# Patient Record
Sex: FEMALE | Race: WHITE | HISPANIC OR LATINO | Employment: FULL TIME | URBAN - METROPOLITAN AREA
[De-identification: names, ages, dates, MRNs, and addresses within clinical notes are randomized per-mention and may not be internally consistent; named-entity substitution may affect disease eponyms.]

---

## 2018-01-22 ENCOUNTER — HOSPITAL ENCOUNTER (EMERGENCY)
Facility: MEDICAL CENTER | Age: 23
End: 2018-01-22

## 2018-01-22 ENCOUNTER — HOSPITAL ENCOUNTER (EMERGENCY)
Facility: MEDICAL CENTER | Age: 23
End: 2018-01-22
Attending: EMERGENCY MEDICINE
Payer: COMMERCIAL

## 2018-01-22 VITALS
DIASTOLIC BLOOD PRESSURE: 82 MMHG | BODY MASS INDEX: 27.95 KG/M2 | WEIGHT: 173.94 LBS | TEMPERATURE: 97.3 F | RESPIRATION RATE: 16 BRPM | HEIGHT: 66 IN | HEART RATE: 77 BPM | OXYGEN SATURATION: 100 % | SYSTOLIC BLOOD PRESSURE: 114 MMHG

## 2018-01-22 LAB
ALBUMIN SERPL BCP-MCNC: 5 G/DL (ref 3.2–4.9)
ALBUMIN/GLOB SERPL: 1.5 G/DL
ALP SERPL-CCNC: 59 U/L (ref 30–99)
ALT SERPL-CCNC: 13 U/L (ref 2–50)
ANION GAP SERPL CALC-SCNC: 9 MMOL/L (ref 0–11.9)
AST SERPL-CCNC: 18 U/L (ref 12–45)
BILIRUB SERPL-MCNC: 0.5 MG/DL (ref 0.1–1.5)
BUN SERPL-MCNC: 8 MG/DL (ref 8–22)
CALCIUM SERPL-MCNC: 9.7 MG/DL (ref 8.5–10.5)
CHLORIDE SERPL-SCNC: 102 MMOL/L (ref 96–112)
CO2 SERPL-SCNC: 24 MMOL/L (ref 20–33)
CREAT SERPL-MCNC: 0.65 MG/DL (ref 0.5–1.4)
ERYTHROCYTE [DISTWIDTH] IN BLOOD BY AUTOMATED COUNT: 43.2 FL (ref 35.9–50)
GLOBULIN SER CALC-MCNC: 3.3 G/DL (ref 1.9–3.5)
GLUCOSE SERPL-MCNC: 84 MG/DL (ref 65–99)
HBV CORE AB SERPL QL IA: NEGATIVE
HBV SURFACE AB SERPL IA-ACNC: 4.37 MIU/ML (ref 0–10)
HBV SURFACE AG SER QL: NEGATIVE
HCT VFR BLD AUTO: 42.8 % (ref 37–47)
HCV AB SER QL: NEGATIVE
HGB BLD-MCNC: 14.3 G/DL (ref 12–16)
HIV 1+2 AB+HIV1 P24 AG SERPL QL IA: NON REACTIVE
MCH RBC QN AUTO: 30.5 PG (ref 27–33)
MCHC RBC AUTO-ENTMCNC: 33.4 G/DL (ref 33.6–35)
MCV RBC AUTO: 91.3 FL (ref 81.4–97.8)
PLATELET # BLD AUTO: 401 K/UL (ref 164–446)
PMV BLD AUTO: 10 FL (ref 9–12.9)
POTASSIUM SERPL-SCNC: 3.8 MMOL/L (ref 3.6–5.5)
PROT SERPL-MCNC: 8.3 G/DL (ref 6–8.2)
RBC # BLD AUTO: 4.69 M/UL (ref 4.2–5.4)
SODIUM SERPL-SCNC: 135 MMOL/L (ref 135–145)
WBC # BLD AUTO: 9.1 K/UL (ref 4.8–10.8)

## 2018-01-22 PROCEDURE — 80053 COMPREHEN METABOLIC PANEL: CPT

## 2018-01-22 PROCEDURE — 87389 HIV-1 AG W/HIV-1&-2 AB AG IA: CPT

## 2018-01-22 PROCEDURE — 86704 HEP B CORE ANTIBODY TOTAL: CPT

## 2018-01-22 PROCEDURE — 86803 HEPATITIS C AB TEST: CPT

## 2018-01-22 PROCEDURE — 85027 COMPLETE CBC AUTOMATED: CPT

## 2018-01-22 PROCEDURE — 90471 IMMUNIZATION ADMIN: CPT

## 2018-01-22 PROCEDURE — 90715 TDAP VACCINE 7 YRS/> IM: CPT | Performed by: EMERGENCY MEDICINE

## 2018-01-22 PROCEDURE — 87340 HEPATITIS B SURFACE AG IA: CPT

## 2018-01-22 PROCEDURE — 86706 HEP B SURFACE ANTIBODY: CPT

## 2018-01-22 PROCEDURE — 99284 EMERGENCY DEPT VISIT MOD MDM: CPT

## 2018-01-22 PROCEDURE — 700111 HCHG RX REV CODE 636 W/ 250 OVERRIDE (IP): Performed by: EMERGENCY MEDICINE

## 2018-01-22 RX ADMIN — CLOSTRIDIUM TETANI TOXOID ANTIGEN (FORMALDEHYDE INACTIVATED), CORYNEBACTERIUM DIPHTHERIAE TOXOID ANTIGEN (FORMALDEHYDE INACTIVATED), BORDETELLA PERTUSSIS TOXOID ANTIGEN (GLUTARALDEHYDE INACTIVATED), BORDETELLA PERTUSSIS FILAMENTOUS HEMAGGLUTININ ANTIGEN (FORMALDEHYDE INACTIVATED), BORDETELLA PERTUSSIS PERTACTIN ANTIGEN, AND BORDETELLA PERTUSSIS FIMBRIAE 2/3 ANTIGEN 0.5 ML: 5; 2; 2.5; 5; 3; 5 INJECTION, SUSPENSION INTRAMUSCULAR at 18:39

## 2018-01-22 ASSESSMENT — LIFESTYLE VARIABLES: DO YOU DRINK ALCOHOL: NO

## 2018-01-22 NOTE — LETTER
"  FORM C-4:  EMPLOYEE’S CLAIM FOR COMPENSATION/ REPORT OF INITIAL TREATMENT  EMPLOYEE’S CLAIM - PROVIDE ALL INFORMATION REQUESTED   First Name  Carolina Last Name  Roth Birthdate             Age  1995 22 y.o. Sex  female Claim Number   Home Employee Address  [address]                                     Zip  [address] Height  1.676 m (5' 6\") Weight  78.9 kg (173 lb 15.1 oz) [facility]     Mailing Employee Address                           [address]               Zip  [address] Telephone  [phone] (home)  Primary Language Spoken  ENGLISH   Insurer  [employer]   [employer] Employee's Occupation (Job Title) When Injury or Occupational Disease Occurred     Employer's Name   Telephone  [phone]    Employer Address  [address] [place] [place] [address]] Zip  [address]   Date of Injury  1/22/2018       Hour of Injury  5:00 PM Date Employer Notified  1/22/2018 Last Day of Work after Injury or Occupational Disease  1/22/2018 Supervisor to Whom Injury Reported  Razia   Address or Location of Accident (if applicable)  [[address]]   What were you doing at the time of accident? (if applicable)  Working    How did this injury or occupational disease occur? Be specific and answer in detail. Use additional sheet if necessary)  Changing the sharps cont.   If you believe that you have an occupational disease, when did you first have knowledge of the disability and it relationship to your employment?  N/A Witnesses to the Accident  Razia     Nature of Injury or Occupational Disease  Workers' Compensation  Part(s) of Body Injured or Affected  Thumb (L), N/A, N/A    I certify that the above is true and correct to the best of my knowledge and that I have provided this information in order to obtain the benefits of [place] Insurance and " [place]).  I hereby authorize any physician, chiropractor, surgeon, practitioner, or other person, any hospital, including [facility] or [facility] hospital, any medical service organization, any insurance company, or other institution or organization to release to each other, any medical or other information, including benefits paid or payable, pertinent to this injury or disease, except information relative to diagnosis, treatment and/or counseling for AIDS, psychological conditions, alcohol or controlled substances, for which I must give specific authorization.  A Photostat of this authorization shall be as valid as the original.   Date  01/22/2018 [facility] Employee’s Signature   THIS REPORT MUST BE COMPLETED AND MAILED WITHIN 3 WORKING DAYS OF TREATMENT   Place  [facility], EMERGENCY DEPT  Name of Facility   [facility]   Date  1/22/2018 Diagnosis  (W27.3XXA) Needlestick injury accident, initial encounter Is there evidence the injured employee was under the influence of alcohol and/or another controlled substance at the time of accident?   Hour  6:55 PM Description of Injury or Disease  Needlestick injury accident, initial encounter No   Treatment  Physician evaluation and treatment of body fluid exposure  Have you advised the patient to remain off work five days or more?         No   X-Ray Findings  Negative   If Yes   From Date    To Date      From information given by the employee, together with medical evidence, can you directly connect this injury or occupational disease as job incurred?  Yes If No, is the employee capable of: Full Duty  Yes Modified Duty  Yes   Is additional medical care by a physician indicated?  Yes  Comments:follow-up at occupational health for testing results If Modified Duty, Specify any Limitations / Restrictions        Do you know of  "any previous injury or disease contributing to this condition or occupational disease?  No   Date  1/22/2018 Print Doctor’s Name  Colby Phelps I certify the employer’s copy of this form was mailed on:   Address  [address]  [place] [address] [phone] Insurer’s Use Only   [facility]  [id number]    Provider’s Tax ID Number    Telephone  Dept: [phone]    Doctor’s Signature  e-COLBY Griffin M.D. Degree   MD    Original - TREATING PHYSICIAN OR CHIROPRACTOR   Pg 2-Insurer/TPA   Pg 3-Employer   Pg 4-Employee                                                                                                  Form C-4 (rev01/03)       BRIEF DESCRIPTION OF RIGHTS AND BENEFITS  (Pursuant to [place]RS 616C.050)    Notice of Injury or Occupational Disease (Incident Report Form C-1): If an injury or occupational disease (OD) arises out of and in the course of employment, you must provide written notice to your employer as soon as practicable, but no later than 7 days after the accident or OD. Your employer shall maintain a sufficient supply of the required forms.    Claim for Compensation (Form C-4): If medical treatment is sought, the form C-4 is available at the place of initial treatment. A completed \"Claim for Compensation\" (Form C-4) must be filed within 90 days after an accident or OD. The treating physician or chiropractor must, within 3 working days after treatment, complete and mail to the employer, the employer's insurer and third-party , the Claim for Compensation.    Medical Treatment: If you require medical treatment for your on-the-job injury or OD, you may be required to select a physician or chiropractor from a list provided by your workers’ compensation insurer, if it has contracted with an Organization for Managed Care (MCO) or Preferred Provider Organization (PPO) or providers of health care. If your employer has not entered into a contract with an MCO or PPO, you " may select a physician or chiropractor from the Panel of Physicians and Chiropractors. Any medical costs related to your industrial injury or OD will be paid by your insurer.    Temporary Total Disability (TTD): If your doctor has certified that you are unable to work for a period of at least 5 consecutive days, or 5 cumulative days in a 20-day period, or places restrictions on you that your employer does not accommodate, you may be entitled to TTD compensation.    Temporary Partial Disability (TPD): If the wage you receive upon reemployment is less than the compensation for TTD to which you are entitled, the insurer may be required to pay you TPD compensation to make up the difference. TPD can only be paid for a maximum of 24 months.    Permanent Partial Disability (PPD): When your medical condition is stable and there is an indication of a PPD as a result of your injury or OD, within 30 days, your insurer must arrange for an evaluation by a rating physician or chiropractor to determine the degree of your PPD. The amount of your PPD award depends on the date of injury, the results of the PPD evaluation and your age and wage.    Permanent Total Disability (PTD): If you are medically certified by a treating physician or chiropractor as permanently and totally disabled and have been granted a PTD status by your insurer, you are entitled to receive monthly benefits not to exceed 66 2/3% of your average monthly wage. The amount of your PTD payments is subject to reduction if you previously received a PPD award.    Vocational Rehabilitation Services: You may be eligible for vocational rehabilitation services if you are unable to return to the job due to a permanent physical impairment or permanent restrictions as a result of your injury or occupational disease.    Transportation and Per Pushpa Reimbursement: You may be eligible for travel expenses and per pushpa associated with medical treatment.  Reopening: You may be able  to reopen your claim if your condition worsens after claim closure.    Appeal Process: If you disagree with a written determination issued by the insurer or the insurer does not respond to your request, you may appeal to the Department of Administration, , by following the instructions contained in your determination letter. You must appeal the determination within 70 days from the date of the determination letter at [address], [address], [place], [place] [address], or [address], [place], [place] [address]. If you disagree with the  decision, you may appeal to the Department of Administration, . You must file your appeal within 30 days from the date of the  decision letter at [address], [address], [place], [place] [address], or [address], [place], [place] [address]. If you disagree with a decision of an , you may file a petition for judicial review with the District Court. You must do so within 30 days of the Appeal Officer’s decision. You may be represented by an  at your own expense or you may contact the [facility] for possible representation.    [place]  for Injured Workers (NAIW): If you disagree with a  decision, you may request that NAIW represent you without charge at an  Hearing. For information regarding denial of benefits, you may contact the [facility] at: [address], [address], [place] [address], [phone], or [address], [place] [address], [phone]    To File a Complaint with the Division: If you wish to file a complaint with the  of the Division of Industrial Relations (DIR), please contact the Workers’ Compensation Section, [address], [address], [place], [place] [address], telephone [phone], or [address], [place]  [address], telephone [phone].    For assistance with Workers’ Compensation Issues: you may contact the Office of the Governor Consumer Health Assistance, [address] [place] [address], [address], [place], [place] [address], Toll Free 1-793.165.7921, Web site: http://[employer].[facility].[place]., E-mail [email or web address].                                                                                                                                                                               __________________________________________________________________                                    ____01/22/2018___            Employee Name / Signature                                                                                                                            Date                                       D-2 (rev. 10/07)

## 2018-01-23 NOTE — DISCHARGE INSTRUCTIONS
Needle Stick Injury  A needle stick injury occurs when you are stuck by a needle that may have the blood from another person on it. Most of the time these injuries heal without any problem, but several diseases can be transmitted this way. You should be aware of the risks. A needle stick injury can cause risk for getting:  · Hepatitis B.  · Hepatitis C.  · HIV infection (the virus that causes AIDS).  The chance of getting one of these infections from a needle stick injury is small. However, it is important to take proper precautions to prevent such an injury. It is also important to understand and follow some health care recommendations when such an injury occurs.   RISK FACTORS  In general, the risk of infection after a needle stick injury appears to be higher with:  · Exposure to a needle that is visibly contaminated with blood.  · Exposure to the blood of a patient with an advanced disease or with a high viral load.  · A deep injury.  · Needle placement in a vein or artery.  PREVENTION   All health care workers should:  · Wash their hands often, including before and after caring for each patient.  · Receive the hepatitis B vaccine before any possible exposure to blood or bloody body fluids.  · Use personal protective equipment (PPE) when appropriate. This includes:  ¨ Gloves.  ¨ Gowns.  ¨ Boots.  ¨ Shoe covers.  ¨ Eyewear.  ¨ Masks.  · Wear gloves when any kind of venous or arterial access is being done.  · Use safety devices when available.  · Use sharp edges and needles with caution.  · Dispose of used needles and other sharps in puncture proof receptacles.  · Never recap needles.  TREATMENT   · After a needle stick injury, immediate cleansing with soap and water or an alcohol-based hand hygiene agent is needed.  · If you did not have a tetanus booster within the past 10 years, a booster shot should be given.  · If the puncture site becomes red, swollen, more painful, or drains yellowish-white fluid (pus),  medicine for a bacterial infection may be needed.  · If the blood on the needle is known or thought to be high risk for hepatitis B or HIV, additional treatment is needed.  · For needle stick exposures to the HIV virus, drug treatment is advised. This treatment is called post-exposure prophylaxis (PEP) and should be started as soon as possible following the injury. The recommended period of treatment with medicines is usually 4 weeks with 2 or more different drugs. You should have follow-up counseling and a medical evaluation, including HIV blood tests, right away. The tests should be repeated at 6 weeks, 3 months, and 6 months. Blood tests to monitor for drug toxicity effects of the PEP medicines are usually recommended immediately before treatment starts and again at 2 weeks and 4 weeks after the start of PEP. Additional recommendations during the first 6 to 12 weeks after exposure include:  ¨ Practicing sexual abstinence or using condoms to prevent sexual transmission and to avoid pregnancy.  ¨ Refraining from donating blood, plasma, semen, organs, or other tissue.  ¨ For breastfeeding women, considering temporary discontinuation of breastfeeding while on PEP.  · For needle stick exposures to hepatitis B, blood testing and PEP is also needed. If you have not been vaccinated against hepatitis B, this vaccine series should be started and hepatitis B immune globulin should also be given. If you have been previously vaccinated, your status of immunity to infection with hepatitis B can be tested by a blood antibody test. Before or after those test results are available, repeat vaccination with or without hepatitis B immune globulin will be considered.  · Unfortunately, no helpful treatment following hepatitis C exposure has been identified or is recommended. Follow-up blood testing is advised over a period of 4 weeks to 6 months to determine if the needle stick led to an infection. Ask your caregiver for advice about  this follow-up testing.  HOME CARE INSTRUCTIONS  · Take medicine exactly as told by your caregiver.  · Keep all follow-up appointments.  · Do not share personal hygiene items.  SEEK IMMEDIATE MEDICAL CARE IF:  · You have concerns about your injury, treatment, or follow-up.  · The injury site becomes red, swollen, or painful.  · The injury site drains pus.  MAKE SURE YOU:  · Understand these instructions.  · Will watch your condition.  · Will get help right away if you are not doing well or get worse.     This information is not intended to replace advice given to you by your health care provider. Make sure you discuss any questions you have with your health care provider.     Document Released: 12/18/2006 Document Revised: 01/08/2016 Document Reviewed: 05/22/2012  [employer] Interactive Patient Education ©2016 [employer]

## 2018-01-23 NOTE — ED NOTES
Pt ambulates to triage, transfer from [facility], for needle stick.  Pt states she poked herself with a dirty needle at 1600 today while at work.  A&ox4.  Pt to lobby & advised to inform RN of any changes.

## 2018-01-23 NOTE — ED NOTES
All lines and monitors DC'd.  Discharge instructions given, questions answered.  Ambulatory out of ER, escorted by RN.  Pt reports all belongings in possession.  Instructed not to drive after taking pain meds and pt verbalizes understanding.  Rx x zero given.   Patient instructed to follow up with [facility]

## 2018-01-23 NOTE — ED PROVIDER NOTES
"ED Provider Note    CHIEF COMPLAINT  Chief Complaint   Patient presents with   • Body Fluid Exposure     poked self with dirty needle at 1600 today       HPI  Nuvia Roth is a 22 y.o. female who presents for evaluation of occupational fluid exposure. 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 While she was disposing of the needle she accidentally caused a superficial needle stick in the dorsal aspect of the left hand without bleeding. She was sent over for evaluation. The patient is otherwise healthy. Needlestick occurred about 2 hours ago she medially washed her hands there is no bleeding. The source patient is apparently homosexual but reportedly hepatitis and HIV negative. He apparently is getting referred for laboratory testing tomorrow.     REVIEW OF SYSTEMS  See HPI for further details. No numbness tingling weakness fevers or chills All other systems are negative.     PAST MEDICAL HISTORY  Past Medical History:   Diagnosis Date   • H. pylori infection        FAMILY HISTORY  Noncontributory    SOCIAL HISTORY  Social History     Social History   • Marital status: Single     Spouse name: N/A   • Number of children: N/A   • Years of education: N/A     Social History Main Topics   • Smoking status: Not on file   • Smokeless tobacco: Not on file   • Alcohol use Not on file   • Drug use: Unknown   • Sexual activity: Not on file     Other Topics Concern   • Not on file     Social History Narrative   • No narrative on file   No drugs or alcohol    SURGICAL HISTORY  No past surgical history on file.  No major surgeries  CURRENT MEDICATIONS  Home Medications    **Home medications have not yet been reviewed for this encounter**         ALLERGIES  No Known Allergies    PHYSICAL EXAM  VITAL SIGNS: /72   Pulse 76   Temp 36.3 °C (97.3 °F)   Resp 14   Ht 1.676 m (5' 6\")   Wt 78.9 kg (173 lb 15.1 oz)   SpO2 100%   BMI 28.08 kg/m²  Room " air O2: 100    Constitutional: Well developed, Well nourished, No acute distress, Non-toxic appearance.   HENT: Normocephalic, Atraumatic, Bilateral external ears normal, Oropharynx moist, No oral exudates, Nose normal.   Eyes: PERRLA, EOMI, Conjunctiva normal, No discharge.   Neck: Normal range of motion, No tenderness, Supple, No stridor.   Cardiovascular: Normal heart rate, Normal rhythm, No murmurs, No rubs, No gallops.   Thorax & Lungs: Normal breath sounds, No respiratory distress, No wheezing, No chest tenderness.   Abdomen: Bowel sounds normal, Soft, No tenderness, No masses, No pulsatile masses.   Extremities: Superficial barely visible needlestick on the dorsal aspect of the left hand no bleeding no foreign body  Neurologic: Alert & oriented x 3, Normal motor function, Normal sensory function, No focal deficits noted.   Psychiatric: Affect normal, Judgment normal, Mood normal.     COURSE & MEDICAL DECISION MAKING  Pertinent Labs & Imaging studies reviewed. (See chart for details)  I counseled the patient on the risks and benefits of PEP treatment. This is an exceedingly low risk injury and the fact that it was an intramuscular shot. The source patient is apparently homosexual which is a risk factor but she apparently gets tested and is known to be HIV negative and he is tested tomorrow. I counseled the patient that this is an exceedingly low risk exposure but I did still offer 1st dose of PEP. She has declined which I think is reasonable given the fact that this is a low risk injury. She was given a tetanus vaccine and will need to follow up at occupational health for ongoing management    FINAL IMPRESSION  1. Needlestick left hand with body fluid exposure         Electronically signed by: Colby Phelps, 1/22/2018 6:12 PM

## 2021-03-27 ENCOUNTER — HOSPITAL ENCOUNTER (OUTPATIENT)
Dept: LAB | Facility: MEDICAL CENTER | Age: 26
End: 2021-03-27
Attending: OBSTETRICS & GYNECOLOGY
Payer: COMMERCIAL

## 2021-03-27 LAB
B-HCG SERPL-ACNC: <1 MIU/ML (ref 0–5)
PROGEST SERPL-MCNC: 17.5 NG/ML
PROLACTIN SERPL-MCNC: 21.6 NG/ML (ref 2.8–26)

## 2021-03-27 PROCEDURE — 84144 ASSAY OF PROGESTERONE: CPT

## 2021-03-27 PROCEDURE — 36415 COLL VENOUS BLD VENIPUNCTURE: CPT

## 2021-03-27 PROCEDURE — 84702 CHORIONIC GONADOTROPIN TEST: CPT

## 2021-03-27 PROCEDURE — 84146 ASSAY OF PROLACTIN: CPT

## 2021-12-17 ENCOUNTER — HOSPITAL ENCOUNTER (OUTPATIENT)
Dept: LAB | Facility: MEDICAL CENTER | Age: 26
End: 2021-12-17
Attending: OBSTETRICS & GYNECOLOGY
Payer: COMMERCIAL

## 2021-12-17 LAB
ABO GROUP BLD: NORMAL
BLD GP AB SCN SERPL QL: NORMAL
HCV AB SER QL: NORMAL
HIV 1+2 AB+HIV1 P24 AG SERPL QL IA: NORMAL
RH BLD: NORMAL

## 2021-12-17 PROCEDURE — 86762 RUBELLA ANTIBODY: CPT

## 2021-12-17 PROCEDURE — 86803 HEPATITIS C AB TEST: CPT

## 2021-12-17 PROCEDURE — 86850 RBC ANTIBODY SCREEN: CPT

## 2021-12-17 PROCEDURE — 87340 HEPATITIS B SURFACE AG IA: CPT

## 2021-12-17 PROCEDURE — 81420 FETAL CHRMOML ANEUPLOIDY: CPT

## 2021-12-17 PROCEDURE — 81220 CFTR GENE COM VARIANTS: CPT

## 2021-12-17 PROCEDURE — 36415 COLL VENOUS BLD VENIPUNCTURE: CPT

## 2021-12-17 PROCEDURE — 86901 BLOOD TYPING SEROLOGIC RH(D): CPT

## 2021-12-17 PROCEDURE — 85025 COMPLETE CBC W/AUTO DIFF WBC: CPT

## 2021-12-17 PROCEDURE — 86780 TREPONEMA PALLIDUM: CPT

## 2021-12-17 PROCEDURE — 87389 HIV-1 AG W/HIV-1&-2 AB AG IA: CPT

## 2021-12-17 PROCEDURE — 86900 BLOOD TYPING SEROLOGIC ABO: CPT

## 2021-12-17 PROCEDURE — 81003 URINALYSIS AUTO W/O SCOPE: CPT

## 2021-12-24 ENCOUNTER — HOSPITAL ENCOUNTER (OUTPATIENT)
Facility: MEDICAL CENTER | Age: 26
End: 2021-12-24
Attending: OBSTETRICS & GYNECOLOGY
Payer: COMMERCIAL

## 2021-12-24 LAB
APPEARANCE UR: ABNORMAL
BACTERIA #/AREA URNS HPF: ABNORMAL /HPF
BILIRUB UR QL STRIP.AUTO: NEGATIVE
COLOR UR: YELLOW
EPI CELLS #/AREA URNS HPF: ABNORMAL /HPF
GLUCOSE UR STRIP.AUTO-MCNC: NEGATIVE MG/DL
HYALINE CASTS #/AREA URNS LPF: ABNORMAL /LPF
KETONES UR STRIP.AUTO-MCNC: NEGATIVE MG/DL
LEUKOCYTE ESTERASE UR QL STRIP.AUTO: ABNORMAL
MICRO URNS: ABNORMAL
NITRITE UR QL STRIP.AUTO: NEGATIVE
PH UR STRIP.AUTO: 6.5 [PH] (ref 5–8)
PROT UR QL STRIP: NEGATIVE MG/DL
RBC # URNS HPF: ABNORMAL /HPF
RBC UR QL AUTO: ABNORMAL
SP GR UR STRIP.AUTO: 1.01
UROBILINOGEN UR STRIP.AUTO-MCNC: 0.2 MG/DL
WBC #/AREA URNS HPF: ABNORMAL /HPF

## 2021-12-24 PROCEDURE — 81001 URINALYSIS AUTO W/SCOPE: CPT

## 2021-12-24 PROCEDURE — 87086 URINE CULTURE/COLONY COUNT: CPT

## 2021-12-26 LAB
BACTERIA UR CULT: NORMAL
BASOPHILS # BLD AUTO: 0.4 % (ref 0–1.8)
BASOPHILS # BLD: 0.05 K/UL (ref 0–0.12)
COLOR UR: ABNORMAL
EOSINOPHIL # BLD AUTO: 0.04 K/UL (ref 0–0.51)
EOSINOPHIL NFR BLD: 0.3 % (ref 0–6.9)
ERYTHROCYTE [DISTWIDTH] IN BLOOD BY AUTOMATED COUNT: 45.6 FL (ref 35.9–50)
HBV SURFACE AG SER QL: ABNORMAL
HCT VFR BLD AUTO: 38.6 % (ref 37–47)
HGB BLD-MCNC: 12.8 G/DL (ref 12–16)
IMM GRANULOCYTES # BLD AUTO: 0.06 K/UL (ref 0–0.11)
IMM GRANULOCYTES NFR BLD AUTO: 0.4 % (ref 0–0.9)
LYMPHOCYTES # BLD AUTO: 2.97 K/UL (ref 1–4.8)
LYMPHOCYTES NFR BLD: 20.9 % (ref 22–41)
MCH RBC QN AUTO: 31.1 PG (ref 27–33)
MCHC RBC AUTO-ENTMCNC: 33.2 G/DL (ref 33.6–35)
MCV RBC AUTO: 93.7 FL (ref 81.4–97.8)
MONOCYTES # BLD AUTO: 0.75 K/UL (ref 0–0.85)
MONOCYTES NFR BLD AUTO: 5.3 % (ref 0–13.4)
NEUTROPHILS # BLD AUTO: 10.34 K/UL (ref 2–7.15)
NEUTROPHILS NFR BLD: 72.7 % (ref 44–72)
NRBC # BLD AUTO: 0 K/UL
NRBC BLD-RTO: 0 /100 WBC
PLATELET # BLD AUTO: 353 K/UL (ref 164–446)
PMV BLD AUTO: 13.5 FL (ref 9–12.9)
RBC # BLD AUTO: 4.12 M/UL (ref 4.2–5.4)
RUBV AB SER QL: 234 IU/ML
SIGNIFICANT IND 70042: NORMAL
SITE SITE: NORMAL
SOURCE SOURCE: NORMAL
TREPONEMA PALLIDUM IGG+IGM AB [PRESENCE] IN SERUM OR PLASMA BY IMMUNOASSAY: ABNORMAL
WBC # BLD AUTO: 14.2 K/UL (ref 4.8–10.8)

## 2021-12-27 LAB
ANNOTATION COMMENT IMP: NORMAL
CF EXPANDED VARIANT PANEL INTERP Q4864: NORMAL
CFTR ALLELE 1 BLD/T QL: NEGATIVE
CFTR ALLELE 1 BLD/T QL: NEGATIVE
CFTR MUT ANL BLD/T: NORMAL
FET CHR X + Y ANEUP RISK PLAS.CFDNA QN: NORMAL
FET SEX US: NORMAL
FET TS 13 RISK PLAS.CFDNA QL: NORMAL
FET TS 18 RISK PLAS.CFDNA QL: NORMAL
FET TS 21 RISK PLAS.CFDNA QL: NORMAL
FETAL FRACTION Q0204: 5.8 %
GA (DAYS): 3 D
GA (WEEKS): 10 WEEKS
PATHOLOGY STUDY: NORMAL
SERVICE CMNT-IMP: YES
TRIPLOIDY VAN TWIN Q0202: NORMAL

## 2022-02-08 ENCOUNTER — HOSPITAL ENCOUNTER (OUTPATIENT)
Dept: RADIOLOGY | Facility: MEDICAL CENTER | Age: 27
End: 2022-02-08
Attending: OBSTETRICS & GYNECOLOGY
Payer: COMMERCIAL

## 2022-02-17 ENCOUNTER — HOSPITAL ENCOUNTER (OUTPATIENT)
Dept: RADIOLOGY | Facility: MEDICAL CENTER | Age: 27
End: 2022-02-17
Attending: OBSTETRICS & GYNECOLOGY
Payer: COMMERCIAL

## 2022-02-17 DIAGNOSIS — N63.0 BREAST SWELLING: ICD-10-CM

## 2022-02-17 DIAGNOSIS — L53.9 ERYTHEMA: ICD-10-CM

## 2022-02-17 PROCEDURE — 76642 ULTRASOUND BREAST LIMITED: CPT | Mod: RT

## 2022-06-15 LAB — GP B STREP DNA SPEC QL NAA+PROBE: NORMAL

## 2022-06-27 ENCOUNTER — ANESTHESIA (OUTPATIENT)
Dept: ANESTHESIOLOGY | Facility: MEDICAL CENTER | Age: 27
End: 2022-06-27
Payer: COMMERCIAL

## 2022-06-27 ENCOUNTER — HOSPITAL ENCOUNTER (INPATIENT)
Facility: MEDICAL CENTER | Age: 27
LOS: 2 days | End: 2022-06-29
Attending: OBSTETRICS & GYNECOLOGY | Admitting: OBSTETRICS & GYNECOLOGY
Payer: COMMERCIAL

## 2022-06-27 ENCOUNTER — APPOINTMENT (OUTPATIENT)
Dept: OBGYN | Facility: MEDICAL CENTER | Age: 27
End: 2022-06-27
Attending: OBSTETRICS & GYNECOLOGY
Payer: COMMERCIAL

## 2022-06-27 ENCOUNTER — ANESTHESIA EVENT (OUTPATIENT)
Dept: ANESTHESIOLOGY | Facility: MEDICAL CENTER | Age: 27
End: 2022-06-27
Payer: COMMERCIAL

## 2022-06-27 ENCOUNTER — APPOINTMENT (OUTPATIENT)
Dept: RADIOLOGY | Facility: MEDICAL CENTER | Age: 27
End: 2022-06-27
Attending: OBSTETRICS & GYNECOLOGY
Payer: COMMERCIAL

## 2022-06-27 LAB
BASOPHILS # BLD AUTO: 0.4 % (ref 0–1.8)
BASOPHILS # BLD: 0.06 K/UL (ref 0–0.12)
EOSINOPHIL # BLD AUTO: 0.04 K/UL (ref 0–0.51)
EOSINOPHIL NFR BLD: 0.3 % (ref 0–6.9)
ERYTHROCYTE [DISTWIDTH] IN BLOOD BY AUTOMATED COUNT: 45.1 FL (ref 35.9–50)
HCT VFR BLD AUTO: 33.5 % (ref 37–47)
HGB BLD-MCNC: 10.6 G/DL (ref 12–16)
HOLDING TUBE BB 8507: NORMAL
IMM GRANULOCYTES # BLD AUTO: 0.21 K/UL (ref 0–0.11)
IMM GRANULOCYTES NFR BLD AUTO: 1.4 % (ref 0–0.9)
LYMPHOCYTES # BLD AUTO: 2.23 K/UL (ref 1–4.8)
LYMPHOCYTES NFR BLD: 15.3 % (ref 22–41)
MCH RBC QN AUTO: 28.5 PG (ref 27–33)
MCHC RBC AUTO-ENTMCNC: 31.6 G/DL (ref 33.6–35)
MCV RBC AUTO: 90.1 FL (ref 81.4–97.8)
MONOCYTES # BLD AUTO: 0.97 K/UL (ref 0–0.85)
MONOCYTES NFR BLD AUTO: 6.7 % (ref 0–13.4)
NEUTROPHILS # BLD AUTO: 11.05 K/UL (ref 2–7.15)
NEUTROPHILS NFR BLD: 75.9 % (ref 44–72)
NRBC # BLD AUTO: 0 K/UL
NRBC BLD-RTO: 0 /100 WBC
PLATELET # BLD AUTO: 263 K/UL (ref 164–446)
PMV BLD AUTO: 14.3 FL (ref 9–12.9)
RBC # BLD AUTO: 3.72 M/UL (ref 4.2–5.4)
WBC # BLD AUTO: 14.6 K/UL (ref 4.8–10.8)

## 2022-06-27 PROCEDURE — 36415 COLL VENOUS BLD VENIPUNCTURE: CPT

## 2022-06-27 PROCEDURE — 770002 HCHG ROOM/CARE - OB PRIVATE (112)

## 2022-06-27 PROCEDURE — 700102 HCHG RX REV CODE 250 W/ 637 OVERRIDE(OP): Performed by: OBSTETRICS & GYNECOLOGY

## 2022-06-27 PROCEDURE — 01960 ANES VAGINAL DELIVERY ONLY: CPT | Performed by: ANESTHESIOLOGY

## 2022-06-27 PROCEDURE — 85025 COMPLETE CBC W/AUTO DIFF WBC: CPT

## 2022-06-27 PROCEDURE — 76815 OB US LIMITED FETUS(S): CPT

## 2022-06-27 PROCEDURE — 700105 HCHG RX REV CODE 258: Performed by: OBSTETRICS & GYNECOLOGY

## 2022-06-27 PROCEDURE — 303615 HCHG EPIDURAL/SPINAL ANESTHESIA FOR LABOR

## 2022-06-27 PROCEDURE — 700111 HCHG RX REV CODE 636 W/ 250 OVERRIDE (IP)

## 2022-06-27 PROCEDURE — 700111 HCHG RX REV CODE 636 W/ 250 OVERRIDE (IP): Performed by: ANESTHESIOLOGY

## 2022-06-27 PROCEDURE — 700111 HCHG RX REV CODE 636 W/ 250 OVERRIDE (IP): Performed by: OBSTETRICS & GYNECOLOGY

## 2022-06-27 PROCEDURE — A9270 NON-COVERED ITEM OR SERVICE: HCPCS | Performed by: OBSTETRICS & GYNECOLOGY

## 2022-06-27 RX ORDER — SODIUM CHLORIDE, SODIUM LACTATE, POTASSIUM CHLORIDE, CALCIUM CHLORIDE 600; 310; 30; 20 MG/100ML; MG/100ML; MG/100ML; MG/100ML
INJECTION, SOLUTION INTRAVENOUS CONTINUOUS
Status: DISCONTINUED | OUTPATIENT
Start: 2022-06-27 | End: 2022-06-27

## 2022-06-27 RX ORDER — SODIUM CHLORIDE, SODIUM LACTATE, POTASSIUM CHLORIDE, CALCIUM CHLORIDE 600; 310; 30; 20 MG/100ML; MG/100ML; MG/100ML; MG/100ML
INJECTION, SOLUTION INTRAVENOUS CONTINUOUS
Status: DISCONTINUED | OUTPATIENT
Start: 2022-06-27 | End: 2022-06-29 | Stop reason: HOSPADM

## 2022-06-27 RX ORDER — IBUPROFEN 800 MG/1
800 TABLET ORAL
Status: DISCONTINUED | OUTPATIENT
Start: 2022-06-27 | End: 2022-06-28 | Stop reason: HOSPADM

## 2022-06-27 RX ORDER — OXYTOCIN 10 [USP'U]/ML
10 INJECTION, SOLUTION INTRAMUSCULAR; INTRAVENOUS
Status: DISCONTINUED | OUTPATIENT
Start: 2022-06-27 | End: 2022-06-28 | Stop reason: HOSPADM

## 2022-06-27 RX ORDER — BUPIVACAINE HYDROCHLORIDE 2.5 MG/ML
INJECTION, SOLUTION EPIDURAL; INFILTRATION; INTRACAUDAL
Status: COMPLETED
Start: 2022-06-27 | End: 2022-06-27

## 2022-06-27 RX ORDER — ACETAMINOPHEN 500 MG
1000 TABLET ORAL
Status: DISCONTINUED | OUTPATIENT
Start: 2022-06-27 | End: 2022-06-28 | Stop reason: HOSPADM

## 2022-06-27 RX ORDER — TERBUTALINE SULFATE 1 MG/ML
0.25 INJECTION, SOLUTION SUBCUTANEOUS
Status: DISCONTINUED | OUTPATIENT
Start: 2022-06-27 | End: 2022-06-28 | Stop reason: HOSPADM

## 2022-06-27 RX ORDER — ROPIVACAINE HYDROCHLORIDE 2 MG/ML
INJECTION, SOLUTION EPIDURAL; INFILTRATION; PERINEURAL
Status: COMPLETED
Start: 2022-06-27 | End: 2022-06-27

## 2022-06-27 RX ORDER — BUPIVACAINE HYDROCHLORIDE 2.5 MG/ML
INJECTION, SOLUTION EPIDURAL; INFILTRATION; INTRACAUDAL PRN
Status: DISCONTINUED | OUTPATIENT
Start: 2022-06-27 | End: 2022-06-28 | Stop reason: SURG

## 2022-06-27 RX ADMIN — MISOPROSTOL 25 MCG: 100 TABLET ORAL at 08:48

## 2022-06-27 RX ADMIN — BUPIVACAINE HYDROCHLORIDE 2.5 ML: 2.5 INJECTION, SOLUTION EPIDURAL; INFILTRATION; INTRACAUDAL; PERINEURAL at 20:43

## 2022-06-27 RX ADMIN — OXYTOCIN 2 MILLI-UNITS/MIN: 10 INJECTION, SOLUTION INTRAMUSCULAR; INTRAVENOUS at 13:12

## 2022-06-27 RX ADMIN — BUPIVACAINE HYDROCHLORIDE 2.5 ML: 2.5 INJECTION, SOLUTION EPIDURAL; INFILTRATION; INTRACAUDAL; PERINEURAL at 20:48

## 2022-06-27 RX ADMIN — ROPIVACAINE HYDROCHLORIDE 200 MG: 2 INJECTION, SOLUTION EPIDURAL; INFILTRATION at 20:51

## 2022-06-27 RX ADMIN — FENTANYL CITRATE 50 MCG: 50 INJECTION, SOLUTION INTRAMUSCULAR; INTRAVENOUS at 20:43

## 2022-06-27 RX ADMIN — SODIUM CHLORIDE, POTASSIUM CHLORIDE, SODIUM LACTATE AND CALCIUM CHLORIDE: 600; 310; 30; 20 INJECTION, SOLUTION INTRAVENOUS at 08:16

## 2022-06-27 RX ADMIN — FENTANYL CITRATE 50 MCG: 50 INJECTION, SOLUTION INTRAMUSCULAR; INTRAVENOUS at 20:48

## 2022-06-27 RX ADMIN — ROPIVACAINE HYDROCHLORIDE 200 MG: 2 INJECTION, SOLUTION EPIDURAL; INFILTRATION; PERINEURAL at 20:51

## 2022-06-27 ASSESSMENT — COPD QUESTIONNAIRES
HAVE YOU SMOKED AT LEAST 100 CIGARETTES IN YOUR ENTIRE LIFE: NO/DON'T KNOW
IN THE PAST 12 MONTHS DO YOU DO LESS THAN YOU USED TO BECAUSE OF YOUR BREATHING PROBLEMS: DISAGREE/UNSURE
DURING THE PAST 4 WEEKS HOW MUCH DID YOU FEEL SHORT OF BREATH: NONE/LITTLE OF THE TIME
COPD SCREENING SCORE: 0
DO YOU EVER COUGH UP ANY MUCUS OR PHLEGM?: NO/ONLY WITH OCCASIONAL COLDS OR INFECTIONS

## 2022-06-27 ASSESSMENT — LIFESTYLE VARIABLES
HOW MANY TIMES IN THE PAST YEAR HAVE YOU HAD 5 OR MORE DRINKS IN A DAY: 0
CONSUMPTION TOTAL: NEGATIVE
TOTAL SCORE: 0
HAVE PEOPLE ANNOYED YOU BY CRITICIZING YOUR DRINKING: NO
ON A TYPICAL DAY WHEN YOU DRINK ALCOHOL HOW MANY DRINKS DO YOU HAVE: 0
HAVE YOU EVER FELT YOU SHOULD CUT DOWN ON YOUR DRINKING: NO
TOTAL SCORE: 0
EVER_SMOKED: NEVER
EVER HAD A DRINK FIRST THING IN THE MORNING TO STEADY YOUR NERVES TO GET RID OF A HANGOVER: NO
AVERAGE NUMBER OF DAYS PER WEEK YOU HAVE A DRINK CONTAINING ALCOHOL: 0
EVER FELT BAD OR GUILTY ABOUT YOUR DRINKING: NO
DOES PATIENT WANT TO STOP DRINKING: NO
TOTAL SCORE: 0

## 2022-06-27 ASSESSMENT — PATIENT HEALTH QUESTIONNAIRE - PHQ9
1. LITTLE INTEREST OR PLEASURE IN DOING THINGS: NOT AT ALL
2. FEELING DOWN, DEPRESSED, IRRITABLE, OR HOPELESS: NOT AT ALL
SUM OF ALL RESPONSES TO PHQ9 QUESTIONS 1 AND 2: 0

## 2022-06-27 NOTE — CARE PLAN
Problem: Knowledge Deficit - L&D  Goal: Patient and family/caregivers will demonstrate understanding of plan of care, disease process/condition, diagnostic tests and medications  Outcome: Progressing  Note: POC was discussed with pt.   The patient is Stable - Low risk of patient condition declining or worsening         Progress made toward(s) clinical / shift goals:  pt is educated in pain    Patient is not progressing towards the following goals:

## 2022-06-27 NOTE — PROGRESS NOTES
Pt is here for IOL due to low TYRA and aging of placenta.   37.6 weeks gestation, upon SVE could not verify position. Dr Mcnamara was called. US was ordered for position.  Pt was admitted, IV started. .  0850 US verified the vertex position.  0853 cytotec was placed.  190 Dr Mcnamara in SVE 3/70/1, AROM clear, IUPC was inserted. Report given to Shea LOZANO

## 2022-06-28 PROCEDURE — 770002 HCHG ROOM/CARE - OB PRIVATE (112)

## 2022-06-28 PROCEDURE — 700111 HCHG RX REV CODE 636 W/ 250 OVERRIDE (IP): Performed by: OBSTETRICS & GYNECOLOGY

## 2022-06-28 PROCEDURE — A9270 NON-COVERED ITEM OR SERVICE: HCPCS

## 2022-06-28 PROCEDURE — 3E033VJ INTRODUCTION OF OTHER HORMONE INTO PERIPHERAL VEIN, PERCUTANEOUS APPROACH: ICD-10-PCS | Performed by: OBSTETRICS & GYNECOLOGY

## 2022-06-28 PROCEDURE — A9270 NON-COVERED ITEM OR SERVICE: HCPCS | Performed by: OBSTETRICS & GYNECOLOGY

## 2022-06-28 PROCEDURE — 59409 OBSTETRICAL CARE: CPT

## 2022-06-28 PROCEDURE — 0KQM0ZZ REPAIR PERINEUM MUSCLE, OPEN APPROACH: ICD-10-PCS | Performed by: OBSTETRICS & GYNECOLOGY

## 2022-06-28 PROCEDURE — 700102 HCHG RX REV CODE 250 W/ 637 OVERRIDE(OP)

## 2022-06-28 PROCEDURE — 700102 HCHG RX REV CODE 250 W/ 637 OVERRIDE(OP): Performed by: OBSTETRICS & GYNECOLOGY

## 2022-06-28 PROCEDURE — 3E0P7VZ INTRODUCTION OF HORMONE INTO FEMALE REPRODUCTIVE, VIA NATURAL OR ARTIFICIAL OPENING: ICD-10-PCS | Performed by: OBSTETRICS & GYNECOLOGY

## 2022-06-28 PROCEDURE — 304965 HCHG RECOVERY SERVICES

## 2022-06-28 PROCEDURE — 10907ZC DRAINAGE OF AMNIOTIC FLUID, THERAPEUTIC FROM PRODUCTS OF CONCEPTION, VIA NATURAL OR ARTIFICIAL OPENING: ICD-10-PCS | Performed by: OBSTETRICS & GYNECOLOGY

## 2022-06-28 PROCEDURE — 10H07YZ INSERTION OF OTHER DEVICE INTO PRODUCTS OF CONCEPTION, VIA NATURAL OR ARTIFICIAL OPENING: ICD-10-PCS | Performed by: OBSTETRICS & GYNECOLOGY

## 2022-06-28 RX ORDER — SODIUM CHLORIDE, SODIUM LACTATE, POTASSIUM CHLORIDE, AND CALCIUM CHLORIDE .6; .31; .03; .02 G/100ML; G/100ML; G/100ML; G/100ML
250 INJECTION, SOLUTION INTRAVENOUS PRN
Status: DISCONTINUED | OUTPATIENT
Start: 2022-06-28 | End: 2022-06-28 | Stop reason: HOSPADM

## 2022-06-28 RX ORDER — OXYCODONE HYDROCHLORIDE 5 MG/1
5 TABLET ORAL EVERY 4 HOURS PRN
Status: DISCONTINUED | OUTPATIENT
Start: 2022-06-28 | End: 2022-06-29 | Stop reason: HOSPADM

## 2022-06-28 RX ORDER — CARBOPROST TROMETHAMINE 250 UG/ML
250 INJECTION, SOLUTION INTRAMUSCULAR
Status: DISCONTINUED | OUTPATIENT
Start: 2022-06-28 | End: 2022-06-29 | Stop reason: HOSPADM

## 2022-06-28 RX ORDER — MISOPROSTOL 200 UG/1
TABLET ORAL
Status: COMPLETED
Start: 2022-06-28 | End: 2022-06-28

## 2022-06-28 RX ORDER — SODIUM CHLORIDE, SODIUM LACTATE, POTASSIUM CHLORIDE, CALCIUM CHLORIDE 600; 310; 30; 20 MG/100ML; MG/100ML; MG/100ML; MG/100ML
INJECTION, SOLUTION INTRAVENOUS PRN
Status: DISCONTINUED | OUTPATIENT
Start: 2022-06-28 | End: 2022-06-29 | Stop reason: HOSPADM

## 2022-06-28 RX ORDER — ROPIVACAINE HYDROCHLORIDE 2 MG/ML
INJECTION, SOLUTION EPIDURAL; INFILTRATION; PERINEURAL CONTINUOUS
Status: DISCONTINUED | OUTPATIENT
Start: 2022-06-28 | End: 2022-06-28 | Stop reason: HOSPADM

## 2022-06-28 RX ORDER — METHYLERGONOVINE MALEATE 0.2 MG/ML
0.2 INJECTION INTRAVENOUS
Status: DISCONTINUED | OUTPATIENT
Start: 2022-06-28 | End: 2022-06-29 | Stop reason: HOSPADM

## 2022-06-28 RX ORDER — SODIUM CHLORIDE, SODIUM LACTATE, POTASSIUM CHLORIDE, AND CALCIUM CHLORIDE .6; .31; .03; .02 G/100ML; G/100ML; G/100ML; G/100ML
1000 INJECTION, SOLUTION INTRAVENOUS
Status: DISCONTINUED | OUTPATIENT
Start: 2022-06-28 | End: 2022-06-28 | Stop reason: HOSPADM

## 2022-06-28 RX ORDER — IBUPROFEN 800 MG/1
800 TABLET ORAL EVERY 8 HOURS PRN
Status: DISCONTINUED | OUTPATIENT
Start: 2022-06-28 | End: 2022-06-29 | Stop reason: HOSPADM

## 2022-06-28 RX ORDER — DOCUSATE SODIUM 100 MG/1
100 CAPSULE, LIQUID FILLED ORAL 2 TIMES DAILY PRN
Status: DISCONTINUED | OUTPATIENT
Start: 2022-06-28 | End: 2022-06-29 | Stop reason: HOSPADM

## 2022-06-28 RX ORDER — ACETAMINOPHEN 500 MG
1000 TABLET ORAL EVERY 6 HOURS PRN
Status: DISCONTINUED | OUTPATIENT
Start: 2022-06-28 | End: 2022-06-29 | Stop reason: HOSPADM

## 2022-06-28 RX ADMIN — MISOPROSTOL 800 MCG: 200 TABLET ORAL at 05:35

## 2022-06-28 RX ADMIN — ACETAMINOPHEN 1000 MG: 500 TABLET, FILM COATED ORAL at 10:31

## 2022-06-28 RX ADMIN — Medication 125 ML/HR: at 06:44

## 2022-06-28 RX ADMIN — IBUPROFEN 800 MG: 800 TABLET, FILM COATED ORAL at 17:20

## 2022-06-28 RX ADMIN — Medication 2000 ML/HR: at 05:30

## 2022-06-28 ASSESSMENT — EDINBURGH POSTNATAL DEPRESSION SCALE (EPDS)
THE THOUGHT OF HARMING MYSELF HAS OCCURRED TO ME: NEVER
I HAVE BEEN SO UNHAPPY THAT I HAVE HAD DIFFICULTY SLEEPING: NOT AT ALL
I HAVE FELT SCARED OR PANICKY FOR NO GOOD REASON: NO, NOT AT ALL
I HAVE BLAMED MYSELF UNNECESSARILY WHEN THINGS WENT WRONG: NOT VERY OFTEN
I HAVE BEEN SO UNHAPPY THAT I HAVE BEEN CRYING: NO, NEVER
I HAVE BEEN ANXIOUS OR WORRIED FOR NO GOOD REASON: HARDLY EVER
I HAVE BEEN ABLE TO LAUGH AND SEE THE FUNNY SIDE OF THINGS: AS MUCH AS I ALWAYS COULD
I HAVE FELT SAD OR MISERABLE: NO, NOT AT ALL
THINGS HAVE BEEN GETTING ON TOP OF ME: NO, MOST OF THE TIME I HAVE COPED QUITE WELL
I HAVE LOOKED FORWARD WITH ENJOYMENT TO THINGS: AS MUCH AS I EVER DID

## 2022-06-28 ASSESSMENT — PAIN SCALES - GENERAL: PAIN_LEVEL: 0

## 2022-06-28 NOTE — L&D DELIVERY NOTE
LABOR AND DELIVERY    DELIVERY SUMMARY    STAGE I LABOR:    The patient is a 26-year-old  1, para 0 at 37+6 weeks' gestation based upon her LMP of 10/05/2021 which is consistent with an 8 plus 3 week ultrasound performed on 2021, who presents to labor and delivery for scheduled induction of labor secondary to oligohydramnios. The patient's cervix on admission is 2 cm, 60% effaced, -2 station, posterior, moderate consistency.  The patient received one dose of cytotec upon admission.  She is GBS negative.  She reports positive fetal movement.  She denies vaginal bleeding, leakage of fluid or regular and painful uterine   contractions.  She was started on IV pitocin per protocol.  She underwent AROM clear fluid at 1910 and IUPC was placed at that time.  She received an epidural for labor analgesia and progressed to completely dilated at 0401.  Category I-II tracing during stage I labor.      STAGE II LABOR:    I was present for a  of a viable male infant at 0517.  The vertex of the infant delivered without difficulty.  A nuchal cord was palpated and reduced.  The remainder of the infant delivered without difficulty.  No shoulder dystocia was encountered.  The infant was placed on the mother's abdomen.  The infant's mouth and nose were bulb suctioned and delayed cord clamping for about 1 1/2 minutes was performed and then the cord was clamped and cut.  A segment of cord was clamped and cut for a cord gas and these results are pending.  The APGAR scores were 8 at one minute and 9 at five minutes.    Infant weight is pending.    Cord gases are pending.    STAGE III LABOR:      The placenta delivered intact with a 3-vessel cord. Placenta was sent to pathology.    The patient's perineum was examined and found to have a 2nd degree perineal laceration which was repaired in the usual fashion with 3-0 vicryl.    Bimanual uterine massage and exploration was performed and small clots removed from the lower  uterine segment.    The patient received 800 mcg of cytotec per rectum x 1.      EBL - 300 cc

## 2022-06-28 NOTE — ANESTHESIA TIME REPORT
Anesthesia Start and Stop Event Times     Date Time Event    6/27/2022 1849 Ready for Procedure     2038 Anesthesia Start    6/28/2022 0517 Anesthesia Stop        Responsible Staff  06/27/22 to 06/28/22    Name Role Begin End    Epi Stuart M.D. Anesth 2038 0517        Overtime Reason:  no overtime (within assigned shift)    Comments:

## 2022-06-28 NOTE — ANESTHESIA POSTPROCEDURE EVALUATION
Patient: Nuvia Roth    Procedure Summary     Date: 06/27/22 Room / Location:     Anesthesia Start: 2038 Anesthesia Stop: 06/28/22 0517    Procedure: Labor Epidural Diagnosis:     Scheduled Providers:  Responsible Provider: Epi Stuart M.D.    Anesthesia Type: epidural ASA Status: 2          Final Anesthesia Type: epidural  Last vitals  BP   Blood Pressure: 106/61    Temp   36.6 °C (97.8 °F)    Pulse   61   Resp   18    SpO2   97 %      Anesthesia Post Evaluation    Patient location during evaluation: floor  Patient participation: complete - patient participated  Level of consciousness: awake and alert  Pain score: 0    Airway patency: patent  Anesthetic complications: no  Cardiovascular status: hemodynamically stable  Respiratory status: acceptable  Hydration status: euvolemic    PONV: none          No complications documented.     Nurse Pain Score: 0 (NPRS)

## 2022-06-28 NOTE — H&P
DATE OF ADMISSION:  2022     ADMISSION DIAGNOSES:  1.  A 37 weeks plus 6 days.  2.  Oligohydramnios.  3.  GBS negative.  4.  Size greater than dates.  5.  The [facility] and is recommended for   induction at this time.     HISTORY OF PRESENT ILLNESS:  The patient is a 26-year-old  1, para 0 at  37+6 weeks' gestation based upon her LMP of 10/05/2021 which is consistent   with an 8 plus 3 week ultrasound performed on 2021, who presents to   labor and delivery for scheduled induction of labor secondary to   oligohydramnios.     The patient's cervix on admission is 2 cm, 60% effaced, -2 station, posterior,   moderate consistency.  The patient received one dose of Cytotec upon   admission.  She is GBS negative.  She reports positive fetal movement.  She   denies vaginal bleeding, leakage of fluid or regular and painful uterine   contractions.     Prenatal care with Dr. Wendy Mcnamara, first visit on 2021, total visits   9.  Total weight gain 47 pounds.  Third trimester blood pressures   108-136/69-79.     PRENATAL LABS:  GBS negative on 06/15/2022.  One-hour , three-hour GTT   fasting 76, one-hour 93, two-hour 99, three-hour 98.  MSAFP for open spina   bifida risk is negative.  Pap negative for intraepithelial lesion or   malignancy.  GC chlamydia negative, negative.  Blood type is B positive,   antibody screen negative.  Hep C viral antibody nonreactive, HIV nonreactive,   RPR nonreactive, hepatitis B surface antigen negative, rubella immune. CF   fragile  X and SMA negative.  NIPT testing, low risk for trisomies 21, 18, and   13. Male fetus.     OBSTETRIC ULTRASOUND:  1.  On 2021 at 8+3 weeks' gestation, [place] 2022.  2.  On 2022 at 14+5 weeks' gestation, [place] 07/10/2022.  3.  On 2022 at 22 weeks' gestation, [place] 2022.  4.  On 2022 at 29+4 weeks' gestation, [place] 2022.  5.  On 2022 at 22 weeks' gestation, estimated  fetal weight 461 grams, 1   pound, 84th percentile.  Fetal heart rate 149 beats per minute, vertex   presentation, anterior placenta, no previa, cervix 4.5 cm.  Male fetus. Normal   and complete anatomy.  6.  On 2022 at 29+4 weeks' gestation, estimated fetal weight 3 pounds 2   ounces, 86th percentile.  Fetal heart rate 123 beats per minute, breech   presentation, anterior placenta, no previa.  TYRA 10.4.  [place] 2022.  7.  On 06/15/2022 at 38+1 weeks' gestation, 7 pound 5 ounce at 90th   percentile, TYRA 11, anterior grade 2 placenta, no previa.  I do not have a   copy of the ultrasound that was performed at the [facility]; however,   per the communication with Dr. Snider, he recommended induction of labor for low   TYRA of 6.     PAST OBSTETRIC HISTORY:  She is a primigravida.     PAST SURGICAL HISTORY:  [place] tooth extraction.     ALLERGIES:  No known drug allergies.     PAST MEDICAL HISTORY:  PCOS.     SOCIAL HISTORY:  She denies alcohol, tobacco or drugs of abuse.     PHYSICAL EXAMINATION:  VITAL SIGNS:  Stable.  She is afebrile.  GENERAL:  Alert, awake and oriented x3, in no acute distress.  ABDOMEN:  Gravid, vertex by Leopold's, estimated fetal weight 3600 grams.  PELVIC:  Sterile vaginal exam 2 cm, 60% effaced, -2 station, vertex   presentation.  [place], irregular contractions.  External fetal monitoring   category 1 tracing, reactive, without decels.     LABORATORY DATA:  Admission labs are pending.     ASSESSMENT AND PLAN:  A 26-year-old  1, para 0 at 37+6 weeks' gestation   based upon her sure LMP of 10/05/2021 which is consistent with an 8 plus 3   week ultrasound performed on 2021, who has oligohydramnios and has been   seen by the [facility] and she was recommended for induction of labor.    She has a favorable cervix and GBS negative, but will receive one dose of   Cytotec and be started on IV Pitocin per protocol.     She may have an epidural for labor analgesia.  She will  undergo amniotomy if   needed for labor augmentation and an IUPC and a scalp electrode may be placed   at that time.        ______________________________  MD ABEBE Parrish/AYAN    DD:  06/27/2022 17:10  DT:  06/27/2022 17:48    Job#:  [id number]

## 2022-06-28 NOTE — ANESTHESIA PREPROCEDURE EVALUATION
Date: 22  Procedure: Labor Epidural        mac pregnancy at 37+6 weeks gestation.    Relevant Problems   No relevant active problems       Physical Exam    Airway   Mallampati: II  TM distance: >3 FB  Neck ROM: full       Cardiovascular - normal exam  Rhythm: regular  Rate: normal  (-) murmur     Dental - normal exam           Pulmonary - normal exam  Breath sounds clear to auscultation     Abdominal    Neurological - normal exam                 Anesthesia Plan    ASA 2       Plan - epidural   Neuraxial block will be labor analgesia                  Pertinent diagnostic labs and testing reviewed    Informed Consent:    Anesthetic plan and risks discussed with patient.

## 2022-06-28 NOTE — PROGRESS NOTES
0700: Pt ambulated, voided, hector-care and gown change complete, pt tolerated well. Pt and infant transferred to postpartum via wheelchair, report given to Soumya LOZANO. Pt in stable condition with a firm fundus and light lochia, ID bands verified.

## 2022-06-28 NOTE — PROGRESS NOTES
1900 Report received from Erin Escobar RN.  Assuming care of pt.  Met with pt in room, POC discussed, Dr. Mcnamara at bedside, discussed AROM, pt amenable.  AROM for clear fluid by Dr Mcnamara, IUPC placed. No further needs verbalized, Call light left within reach.    2015 Pt wishes for epidural, Dr Stuart notified to placed epidural    2039 Epidural placement procedure started.  Time out performed    2049 Test dose neg    2052 Pt repositioned in bed.  Reports contraction pain already decreased significantly.  Call light left within reach.

## 2022-06-28 NOTE — LACTATION NOTE
This note was copied from a baby's chart.  Physical assessment of baby and mother provided. Introduction to basics of initiating breastfeeding shown at this time to include mother and baby posture, angle of latch, hand expression, skin to skin and normal  feeding patterns and expectations.    We were unable to achieve a sustained latch and then baby began to spit up so we placed him up on mothers chest skin to skin. Reviewed parent education pamphlet with breast feeding and safe sleep information.     Encouraged parents to call for assistance with latch as needed.Recommended keeping baby skin to skin this afternoon and watching [employer] videos on hand expression and latch.

## 2022-06-28 NOTE — ANESTHESIA PROCEDURE NOTES
Epidural Block    Date/Time: 6/27/2022 8:38 PM  Performed by: Epi Stuart M.D.  Authorized by: Epi Stuart M.D.     Patient Location:  OB  Start Time:  6/27/2022 8:38 PM  End Time:  6/27/2022 8:43 PM  Reason for Block: labor analgesia    patient identified, IV checked, site marked, risks and benefits discussed, surgical consent, monitors and equipment checked, pre-op evaluation and timeout performed    Patient Position:  Sitting  Prep: ChloraPrep, patient draped and sterile technique    Monitoring:  Blood pressure, continuous pulse oximetry and heart rate  Approach:  Midline  Location:  L3-L4  Injection Technique:  DAISY saline  Skin infiltration:  Lidocaine  Strength:  1%  Dose:  3ml  Needle Type:  Tuohy  Needle Gauge:  17 G  Needle Length:  3.5 in  Loss of resistance::  6  Catheter Size:  19 G  Catheter at Skin Depth:  13  Test Dose Result:  Negative   Success on 2nd pass at same level  No heme/CSF  Catheter threaded easily  Negative aspiration  No evidence of complications  Patient comfortable after loading dose

## 2022-06-29 VITALS
HEIGHT: 66 IN | TEMPERATURE: 98.5 F | BODY MASS INDEX: 35.84 KG/M2 | SYSTOLIC BLOOD PRESSURE: 106 MMHG | DIASTOLIC BLOOD PRESSURE: 67 MMHG | HEART RATE: 72 BPM | OXYGEN SATURATION: 98 % | WEIGHT: 223 LBS | RESPIRATION RATE: 18 BRPM

## 2022-06-29 LAB
ERYTHROCYTE [DISTWIDTH] IN BLOOD BY AUTOMATED COUNT: 45.1 FL (ref 35.9–50)
HCT VFR BLD AUTO: 31.1 % (ref 37–47)
HGB BLD-MCNC: 10.2 G/DL (ref 12–16)
MCH RBC QN AUTO: 29.7 PG (ref 27–33)
MCHC RBC AUTO-ENTMCNC: 32.8 G/DL (ref 33.6–35)
MCV RBC AUTO: 90.4 FL (ref 81.4–97.8)
PLATELET # BLD AUTO: 261 K/UL (ref 164–446)
PMV BLD AUTO: 12.2 FL (ref 9–12.9)
RBC # BLD AUTO: 3.44 M/UL (ref 4.2–5.4)
WBC # BLD AUTO: 20.6 K/UL (ref 4.8–10.8)

## 2022-06-29 PROCEDURE — 36415 COLL VENOUS BLD VENIPUNCTURE: CPT

## 2022-06-29 PROCEDURE — 85027 COMPLETE CBC AUTOMATED: CPT

## 2022-06-29 NOTE — PROGRESS NOTES
PP day #1    S:  Pt doing well.  Breast feeding.  Minimal lochia.  No problems voiding. Has not had H/H drawn, but is not asymptomatic. Ready to go home. OTC Motrin for pain    O: T 98.5 P 72  /67  ABD: Soft, NT, FF below umb  EXT: +1 pedal edema, no cords or Homans  PP H/H pending - was 10/33 on admission, EBL for delivery was 300 mL  B+  GBS neg    A/P:  PP day #1  S/P  at term - doing well  1.  D/C home  2.  F/U Dr. Mcnamara  6 weeks  3.  Pelvic rest for 6 weeks  4.  OTC Motrin and Iron

## 2022-06-29 NOTE — PROGRESS NOTES
Report received from Soumya LOZANO. Pt assessment complete. Fundus firm, lochia light rubra. Reviewed POC for this evening. Pt wanting to breastfeed, but baby has been spitty a few times. Advised pt to call at next feeding to help with latching. Pt has no c/o pain at this time. Call light is within pt reach.     Educated and demonstrated to pt how to use the breast pump. Pt gave verbal understanding and will call for assistance as needed. Pump settings: 80 CPM for 2 minutes then down to 60 CPM, suction at 15% (pt comfort level), for 15-20 minutes every 3 hours. Pt does not want to supplement baby feedings at this time. Will continue to work on latching baby first and then use the breast pump. Will report this to lactation consultant this AM for a follow up.

## 2022-06-29 NOTE — CARE PLAN
The patient is Stable - Low risk of patient condition declining or worsening    Shift Goals  Clinical Goals: maintain tolerable pain level    Progress made toward(s) clinical / shift goals:  pt has had no c/o pain tonight. Pt aware of PRN pain medication available to her ans will call for it as needed. Pt is still working on getting baby to latch. Baby has been spitting up clear fluid and no latch yet. Will start pt on a breast pump.     Patient is not progressing towards the following goals:

## 2022-06-29 NOTE — PROGRESS NOTES
Voiced readiness for discharge home. Seen by Lactation RN Smita. DC instructions reviewed w/ pt and fob. Verbalized understanding. Bands matched, cuddles #54 disarmed and removed. DC home in stable condition.

## 2022-06-29 NOTE — DISCHARGE INSTRUCTIONS
PATIENT DISCHARGE EDUCATION INSTRUCTION SHEET  F/U Dr. Mcnamara 6 weeks.  Pelvic rest for 6 weeks.  OTC Motrin, PNV and iron   Question          REASONS TO CALL YOUR OBSTETRICIAN  Persistent fever, shaking, chills (Temperature higher than 100.4) may indicate you have an infection  Heavy bleeding: soaking more than 1 pad per hour; Passing clots an egg-sized clot or bigger may mean you have an postpartum hemorrhage  Foul odor from vagina or bad smelling or discolored discharge or blood  Breast infection (Mastitis symptoms); breast pain, chills, fever, redness or red streaks, may feel flu like symptoms  Urinary pain, burning or frequency  Incision that is not healing, increased redness, swelling, tenderness or pain, or any pus from episiotomy or  site may mean you have an infection  Redness, swelling, warmth, or painful to touch in the calf area of your leg may mean you have a blood clot  Severe or intensified depression, thoughts or feelings of wanting to hurt yourself or someone else   Pain in chest, obstructed breathing or shortness of breath (trouble catching your breath) may mean you are having a postpartum complication. Call your provider immediately   Headache that does not get better, even after taking medicine, a bad headache with vision changes or pain in the upper right area of your belly may mean you have high blood pressure or post birth preeclampsia. Call your provider immediately    HAND WASHING  All family and friends should wash their hands:  Before and after holding the baby  Before feeding the baby  After using the restroom or changing the baby's diaper    WOUND CARE  Ask your physician for additional care instructions. In general:   Incision:  May shower and pat incision dry   Keep the incision clean and dry  There should not be any opening or pus from the incision  Continue to walk at home 3 times a day   Do NOT lift anything heavier than your baby (over 10 pounds)  Encourage family  to help participate in care of the  to allow rest and mom time to heal  Episiotomy/Laceration  May use hector-spray bottle, witch hazel pads and dermaplast spray for comfort  Use hector-spray bottle after urinating to cleanse perineal area  To prevent burning during urination spray hector-water bottle on labial area   Pat perineal area dry until episiotomy/laceration is healed  Continue to use hector-bottle until bleeding stops as needed  If have a 2nd degree laceration or greater, a Sitz bath can offer relief from soreness, burning, and inflammation   Sitz Bath   Sit in 6 inches of warm water and soak laceration as needed until the laceration heals    VAGINAL CARE AND BLEEDING  Nothing inside vagina for 6 weeks:   No sexual intercourse, tampons or douching  Bleeding may continue for 2-4 weeks. Amount and color may vary  Soaking 1 pad or more in an hour for several hours is considered heavy bleeding  Passing large egg sized blood clots can be concerning  If you feel like you have heavy bleeding or are having increasing amount of blood clots call your Obstetrician immediately  If you begin feeling faint upon standing, feeling sick to your stomach, have clammy skin, a really fast heartbeat, have chills, start feeling confused, dizzy, sleepy or weak, or feeling like you're going to faint call your Obstetrician immediately    HYPERTENSION   Preeclampsia or gestational hypertension are types of high blood pressure that only pregnant women can get. It is important for you to be aware of symptoms to seek early intervention and treatment. If you have any of these symptoms immediately call your Obstetrician    Vision changes or blurred vision   Severe headache or pain that is unrelieved with medication and will not go away  Persistent pain in upper abdomen or shoulder   Increased swelling of face, feet, or hands  Difficulty breathing or shortness of breath at rest  Urinating less than usual    URINATION AND BOWEL  "MOVEMENTS  Eating more fiber (bran cereal, fruits, and vegetables) and drinking plenty of fluids will help to avoid constipation  Urinary frequency and urgency after childbirth is normal  If you experience any urinary pain, burning or frequency call your provider    BIRTH CONTROL  It is possible to become pregnant at any time after delivery and while breastfeeding  Plan to discuss a method of birth control with your physician at your post delivery follow up visit    POSTPARTUM BLUES  During the first few days after birth, you may experience a sense of the \"blues\" which may include impatience, irritability or even crying. These feelings come and go quickly. However, as many as 1 in 10 women experience emotional symptoms known as postpartum depression.     POSTPARTUM DEPRESSION    May start as early as the second or third day after delivery or take several weeks or months to develop. Symptoms of \"blues\" are present, but are more intense: Crying spells; loss of appetite; feelings of hopelessness or loss of control; fear of touching the baby; over concern or no concern at all about the baby; little or no concern about your own appearance/caring for yourself; and/or inability to sleep or excessive sleeping. Contact your Obstetrician if you are experiencing any of these symptoms     PREVENTING SHAKEN BABY  If you are angry or stressed, PUT THE BABY IN THE CRIB, step away, take some deep breaths, and wait until you are calm to care for the baby. DO NOT SHAKE THE BABY. You are not alone, call a supporter for help.  [facility] 24/7 crisis call line (487-164-9565) or (1-579.932.2227)  You can also text them, text \"ANSWER\" (087591)  "

## 2022-06-29 NOTE — PROGRESS NOTES
0715 assumed care. Awake, oob/ambulating in room and in no distress. Male infant bundled in open crib and in no distress. FOB @ bedside.

## 2022-06-29 NOTE — LACTATION NOTE
This note was copied from a baby's chart.  Primip Mom and sleepy baby post circumcision. Mom says baby had been latching, but not for very long. LC had her start doing HE to collect drops with a spoon. Mom has a few drops after several minutes of HE. She says she has been getting about 1.5 ml when she pumps. She does have a pump at home. Had Mom attempt to latch baby at left breast in football hold, he did latch and give a couple sucks, but quickly fell back asleep. Also attempted to rouse baby and position in cross cradle on the right. Taught Mom he may be sleepy for several hours post circumcision. She is to do lots of STS and HE feeding all EBM back to baby with a spoon. 3-step plan prn if parents feel baby is too fussy at home, parent choice, they were educated. LC provided feeding volume GL for ABM if they decide to use it. Also discussed paced bottle feeding to help preserve baby at breast. Encouraged Mom to use her pump if baby was too sleepy or she was giving a bottle feeding, pump her breasts. Taught that baby may wake up later today and want to cluster feed tonight, which is normal. Encouraged Mom to take a nap once they are home. She has help with the baby from Grandma and FOB. Once baby is not too sleepy, Mom should expect to feed per baby cues which would be about 10 times daily. She should wake to feed if greater than 2 hours during the day, or 4 hours during the night since a previous feeding. Written educational materials provided.

## 2022-06-29 NOTE — PROGRESS NOTES
Call placed to Dr. Mcnamara and informed her of no CBC order for pt post delivery. Telephone order received for a STAT CBC.

## 2022-07-05 ENCOUNTER — OFFICE VISIT (OUTPATIENT)
Dept: OBGYN | Facility: CLINIC | Age: 27
End: 2022-07-05
Payer: COMMERCIAL

## 2022-07-05 DIAGNOSIS — O92.29 SORE NIPPLES DUE TO LACTATION: ICD-10-CM

## 2022-07-05 DIAGNOSIS — O92.5 LACTATION SUPPRESSION: ICD-10-CM

## 2022-07-05 PROCEDURE — 99215 OFFICE O/P EST HI 40 MIN: CPT | Performed by: NURSE PRACTITIONER

## 2022-07-05 NOTE — PROGRESS NOTES
Summary: Rhonda reports difficulty latching in the hospital. Laquey baby was not getting enough food but was encouraged it was normal. First pediatrician appointment on day 3, 12% weight loss and concern for jaundice. Bili levels came back at normal levels, not requiring treatment. Pumping and supplementing initiated. Pumping every 2-3 hours throughout the day and night. Feeding 50mls of breastmilk and formula for most feedings. Attempting to latch up to 1-2x daily with nipple shield. Reports pain and not feeling baby is on the breast correctly.   Today: Latched to the left breast, cross cradle hold, with the nipple shield. Baby transferred 6mls in 12 minutes. Pumped with hospital pump, yielding 35mls between both breast. Dad pace fed baby the pumped breastmilk.   Plan: Pump 8x every 24 hours with hospital pump, see settings and suggested routine below. Feed baby every 2-2.5 hours throughout the day, up to 3-4 hours during the night. Attempt to latch 1-2x daily as desired, no longer than 10-15 minutes.   Follow up:   Lactation appointment: Monday, 2022  Pediatrician appointment: 2 week well check   Referrals: None    Subjective:     Nuvia Roth is a 26 y.o. female here for lactation care. She is here today with  (Andres) and baby, Jamaal.    Concerns:   Latch on difficulties , nipple pain  and feeling that there is not enough milk      HPI:   Pertinent  history:   Mother does not have a history of advanced maternal age, GDM, hypertension prior to pregnancy, insulin resistance, multiple gestation, PCOS and thyroid disease. Common condition(s) which may interfere with milk supply.      Breast changes in pregnancy: Yes  History of breast surgeries: No      FEEDING HISTORY:    Past breastfeeding history: First baby   Hospital course: Difficulty latching in the hospital. Laquey baby was not getting enough food but was encouraged it was normal.   Currently 2022: First pediatrician  appointment on day 3, 12% weight loss and concern for jaundice. Bili levels came back at normal levels, not requiring treatment. Pumping and supplementing initiated. Pumping every 2-3 hours throughout the day and night. Feeding 50mls of breastmilk and formula for most feedings. Attempting to latch up to 1-2x daily with nipple shield. Reports pain and not feeling baby is on the breast correctly.     Both breasts: No     Supplement: Expressed breast milk and Formula  Quantity: 50mls every feeding  How given/devices:  Bottle     Nipple Shield Use: 24 mm  Recommended by: OP LC   When started? 7/2/2022    Breast Pumping:   Frequency: Every 2-3 hours   Type of pump: Medela Max Flow  Flange size/type: 24mm  Pain with pumping    Maternal ROS:  Constitutional: No fever, chills. Feeling well  Breasts: No soreness of breasts and Soreness of nipples  Psychiatric: Experiencing anxiety, Feels exhausted and Feels overwhelmed  Mental Health: No mention of feeling irritable, agitated, angry, overwhelmed, apathy, exhaustion nor having sleep changes outside infant feeds/demands or appetite changes     Objective:     Maternal Physical Lactation Exam  General: No acute distress  Breasts: Symmetrical , Full, Plugged Duct - no evidence and Mastitis  - no S/S  Nipples: scabbed/crusting and across face of nipple  Psychiatric: Normal mood and affect. Her behavior is normal. Judgment and thought content normal   Mental Health: Did NOT exhibit sadness, crying, feeling overwhelmed, agitation or hypervigilance.     Assessment/Plan & Lactation Counseling:     Infant exam on infant chart    Infant Weight History:   06/28/2022: 7# 5oz  07/01/2022: 6# 7oz   07/02/2022: 6# 11.3oz  07/05/2022: 6# 15.3oz    Infant intake at Breast:  L  6mls     R Not Offered     Total: 6mls  Milk Transfer at this feeding:   Ineffective breastfeeding; not able to transfer a full feed from breast r/t  Pumped: Type of Pump: Hospital grade    Quantity Pumped: L 20mls    R  15mls    Total: 35mls  Initiation of Feeding: Infant initiates  Position of Feeding:    Right: Not Offered  Left: cross cradle  Attachment Achieved: rapidly  Nipple shield:    Size: 24mm       Introduced 7/2/2022  Latch achieved, yes  Suck Pattern at the breast: Suck burst and normal rest  Suck Pattern on the bottle: Suck burst and normal rest, dad paces well  Behavior Following Observed Feeding: content and sleeping  Nipple Pain: Yes  Nipple Pain from:Nipple damage from accumulated microtrauma which lowered failure strength resulting in sudden damage     Latch: Assisted latch  Suckling/Feeding: attaches, baby falls asleep, baby roots, elicits JUAN DIEGO and frequent pauses    Milk Supply Available: low and delayed  Low milk supply:   Likely due to: delay in lactogenesis II and ineffective or infrequent breast stimulation or milk removal    Maternal Diagnosis/Problem:  Sore Nipples   Lactation Suppression       MATERNAL PERSONALIZED BREASTFEEDING PLAN  Discussed concerns and symptoms as listed above in assessment and guidance summarized below.  Shared decision making was used between myself and the family for this encounter, home care, and follow up.  Topics reviewed included:  • Herbs and medications  A galactagogue is an herb or medication taken by a breastfeeding mother to increase her milk supply. We know that for centuries mothers around the world have sought out remedies to increase their milk supply. However, there is limited research on the safety and effectiveness of herbal galactagogues, which makes it hard for us to endorse them. It is not known if any of these herbs are truly effective, and it is difficult to predict how a specific herbal galactagogue will affect an individual, requiring “trial and error” in many situations. When effective, results are generally seen within 24-72 hours of starting an herbal galactagogue. Many of these herbs are used to decrease high blood sugar. If you are diabetic or have  problems with low blood sugar, or thyroid disease you may not be a candidate for herbs. Not all women can increase their low milk supply with a galactagogue due to the many underlying causes of low milk production.  When taking a galactagogue, remember that frequent milk removal is still the most effective way to increase supply.    • 4th Trimester: The 12-week period immediately after mom has had the baby. Not everyone has heard of it, but every mother and their  baby will go through it. It is a time of great physical and emotional change as the baby adjusts to being outside the womb, and the family adjusts to new life as parents  o During the fourth trimester, one can expect fussiness and crying from the baby and very likely exhaustion for the family.  babies are learning to adjust to life outside the womb where it was warm and squishy!  o There is a lot of misinformation about babies and their needs, and parents are often encouraged to ignore baby's signals. Bad idea. Babies are “half-baked” at birth and have much to learn with the help of physical and emotional support from caregivers. Taking care of a baby's needs is an investment that pays off with a happier, healthier child and adult.  o It can take weeks or even months for your body to feel totally normal again. There is a major hormonal upheaval experienced by moms in the first few weeks after birth, because their bodies are shifting from many pregnancy hormones to a more normal hormonal make-up.  •  Mood and Anxiety Disorders: Having a new baby can be joyful time for some new moms, but a difficult time for others. For all, it is a time of profound physical and emotional change. Balancing baby, family, partners and friends, work, pets, and preexisting or new life stressors as well as sleep deprivation can be extremely challenging. Untreated depression, sleep exhaustion, and anxiety are each a challenge that must be addressed and in  addition can contribute to early cessation of breastfeeding. IT is important both for the mental health and physical health of new moms and babies to get on the path to feeling better and if therapeutic support is needed, specific resources are available.   • Sleep or lack of: discussed strategies to manage restorative sleep, although short amounts, significant to the mental health of the mother. The principle follows:  o Mom goes to sleep right after 8pm +/- feeding  o Partner covers first late evening feeding (10pm/11pm), settles baby,  then goes to bed  o Mom covers next feeding 1am /2am, partner sleeps  o Next feeding share  • Self -care through relational support and interaction.   o Encouraged  support, rest, getting out of the house each day, walk or drive somewhere,  a coffee/tea at a drive through  o Allow others to bring you food, help with chores and errands, meeting for a cup of coffee  • Milk supply is dependent on glandular tissue development, hormonal influences, how many times the baby/pump removes milk and how well the breasts are emptied in a 24 hour period. This is a biological reality that we can NOT work around. There's no magic trick, tea, food, drink, cookie or supplement that will increase your milk supply. One  must  effectively remove milk to continue to make and maximize milk. In the early days and weeks that can be 8+ times in 24 hours. For older babies, on average 6-7 + times in 24 hours.    • Hydration: Staying hydrated is important however lack of hydration is usually not a cause of significantly low milk production.  Everyone needs a different amount of water, depending on their activity and diet. A high salt and/or high-protein diet, high physical activity, or very warm weather/sweating will require more fluids. A person eating a diet high in veggies and fruit, with a lack of physical activity will require fewer fluids. There is no magic number for the # of ounces of water  each day.The best way to know that you are well hydrated is by looking at your urine.  Urine should look clear to light pale yellow, and you should need to pee at least every 3 to 4 hours unless you have a large bladder capacity.  • Feeding:   o Feed your baby every 1.5-2.5 hours during the day, up to 3-4 hours at night. More often if baby acts hungry.   o Awaken baby for feeding if going over 2.5 hours in the day.   o Until back to birth weight, ONE four hour at night is acceptable if has had 8 prior feedings in 24 hours.    • Supplement:   o Supplement with expressed milk and formula   - Offer 50-60mls for most feedings  - Increase to 60-75mls in 2-3 days  • Pacifier Use:  The American Academy of Pediatrics' Position Paper reports: Although we recommend a conservative approach regarding pacifier use, we do not endorse a complete ban on the use of pacifiers, nor do we support an approach that induces parental guilt concerning their choices about the use of pacifiers.  • Nipple shield (NS): We prefer the 24mm Medela.   o Before applying, roll the shield in on itself (like a sombrero, and allow breast to be pulled  in to the shield tip).   o The latch is very different from the bare breast, bring the baby to you and let them find the nipple shield and they will manage it, tuck them close once they find it, cheek against breast.   o Once you and your baby are familiar with the NS, you may be able to just put it on the breast and latch the baby without any preparation.  • Positioning Techniques   o Pillow used: Rita 2 [address] Allan  o Suggested positions Cross cradle  o Fine tune position by making sure your fingers beneath the breast as well as your bra, are out of the way of your baby's chin.  o Positioning:  Many positions shown, great sidelying at 7 minutes.   - See http://globalhealthmedia.org/portfolio-items/positions-for-breastfeeding/?[email or web address]  • Pumping Guidelines:  o Both breasts   o Suggested Pump  Routine: 3am, 6am, 8am, 10am, 1pm, 4pm, 7pm and 10pm  o Pump 8 times in 24 hours  o BRA    - Consider a hands free bra - Simple Wishes is recommended.  o Type of pump:  • Hospital grade   • Ameda Augustine Settings [email or web address].[email or web address]/[email or web address]-professionals/videos/ameda-platinum-[email or web address]-hygienikit-video  o Speed: Start at 80 then turn down to 60 after 1.5-2 minutes when you see milk in the flange channel  o Suction: To comfort, goal of  30-50%. NEVER to discomfort.   - Today you were at 18 %  • Always double pump  • How long will vary woman to woman, typically 8-15 minutes, or 1-2 minutes after flow slows  • Flange Fit: Freely moving nipple in the tunnel with some movement of the areola.  • Today's evaluation indicates appropriate flange  • Storage (Acceptable guidelines for healthy term babies)  o 10 hours at room temp including your pieces, may rinse but not mandatory  o 8 days refrigerator, don't need  to refrigerate right away if using fresh pumped milk at the next feeding  o Freezer 1 year  o Deep freezer 2 years  o American Academy or Pediatrics has said you may mix different temperature milks.   o If baby drinks breastmilk from a fresh or refrigerated bottle of breastmilk,  you may return the unused portion to the refrigerator and use once at next feeding.     • Increasing supply besides Galactogogues and Pumping:   o Warmth  o Relaxation   o Physical, auditory narratives  o Childbirth relaxation Techniques  o Acupuncture and acupressure  - [facility] [email or web address]  - Sushil Nye [email or web address]  o Shoulder Massages  o Take a nap    • Nipple care:  o May apply breastmilk  o Silverettes   - Put a few drops of milk in the cup  - Place over nipple, no lotions or creams  - Allow bra to hold them in place    • Connect with other mothers:  o Facebook:   • [place] [facility]: https://www.[email or web address]/[place].breastfeeds/  • Well-Nourished Babies (Private group for  questions and support): [email or web address]  o Breastfeeding Platinum LIVE  WEIGHT CHECKS  • Tuesdays 10am - 11am. [facility] at [facility], [address] [place] [address], 3rd floor conference room  • Check your baby's weight, do a feeding and see how your baby is growing, visit with other mothers, plan on a walk or coffee date after group.  o Please wear a mask coming and going: you may remove it in the classroom  o Due to space limitations - no strollers please (New c/section moms should use the stroller)  o We would love to have dads stay, but moms won't breastfeed if there are men in the room, sorry.  o The room is generally only available from 10am -11am.   o Please take all diapers with you       In Conclusion:   Family present has verbalized what they can realistically do based on family dynamics, understanding a plan has to be doable to be effective and can be renegotiated at any time. This can be a complex and intimate journey. When obstacles present themselves, it takes confidence, persistence and support. You are now the focus of our Breastfeeding Medicine team; we are here to support your decisions and goals.     Follow up requires close monitoring in this time sensitive window of opportunity to establish milk supply and facilitate the learning of  breastfeeding.    Follow-up for infant weight check and dyad breastfeeding evaluation in 6 day(s)  Please call [phone] if you have not scheduled your next appointment  Family is encouraged to e-mail us to update how the plan is working.    A total of 60 minutes, not including infant assessment time, with more than 50% was spent preparing to see the patient, obtaining and reviewing separately obtained history, performing a medically appropriate examination and evaluation, counseling and educating the family, referring and communicating with other health care professionals, documenting clinical information in the electronic health record,  independently interpreting weighted feeds and infant growth results, communicating these results to the family and care coordination as detailed in the above note.       MK Aranda.

## 2022-07-11 ENCOUNTER — GYNECOLOGY VISIT (OUTPATIENT)
Dept: OBGYN | Facility: CLINIC | Age: 27
End: 2022-07-11
Payer: COMMERCIAL

## 2022-07-11 DIAGNOSIS — O92.5 LACTATION SUPPRESSION: ICD-10-CM

## 2022-07-11 PROCEDURE — 99215 OFFICE O/P EST HI 40 MIN: CPT | Performed by: NURSE PRACTITIONER

## 2022-07-11 NOTE — PROGRESS NOTES
Summary: Rhonda has been pumping 8x every 24 hours, yielding 25-80mls between both breasts. Reports higher volumes at night. Feeding baby every 2-3 hours throughout the day, up to 3 hours at night. Offering 40-60mls per feeding. Attempting to latch 1-2x daily.  Today: Latched to the left breast, cross cradle hold, with the nipple shield. Baby transferred 18mls in 16 minutes. Latched to the right breast, cross cradle hold, transferred 2mls in 7 minutes. Pump pieces not available today. Dad renetta fed baby the pumped breastmilk.   Plan: Pump 8x every 24 hours with hospital pump, see settings and suggested routine below. Feed baby every 2-2.5 hours throughout the day, no need to wake for nighttime feedings. Attempt to latch 1-2x daily as desired, no longer than 10-15 minutes.   Follow up:   Lactation appointment: 2022  Pediatrician appointment: 2022   Referrals: None    Subjective:     Nuvia Roth is a 26 y.o. female here for lactation care. She is here today with  (Andres) and baby, Jamaal.    Concerns: Weight Check and Feeding Evaluation       HPI:   Pertinent  history:   Mother does not have a history of advanced maternal age, GDM, hypertension prior to pregnancy, insulin resistance, multiple gestation, PCOS and thyroid disease. Common condition(s) which may interfere with milk supply.      Breast changes in pregnancy: Yes  History of breast surgeries: No      FEEDING HISTORY:    Past breastfeeding history: First baby   Hospital course: Difficulty latching in the hospital. [place]o baby was not getting enough food but was encouraged it was normal.   Prior to consultation on 2022: First pediatrician appointment on day 3, 12% weight loss and concern for jaundice. Bili levels came back at normal levels, not requiring treatment. Pumping and supplementing initiated. Pumping every 2-3 hours throughout the day and night. Feeding 50mls of breastmilk and formula for  most feedings. Attempting to latch up to 1-2x daily with nipple shield. Reports pain and not feeling baby is on the breast correctly.   Currently 7/11/2022: Pumping 8x every 24 hours, yielding 25-80mls between both breasts. Reports higher volumes at night. Feeding baby every 2-3 hours throughout the day, up to 3 hours at night. Offering 40-60mls per feeding. Attempting to latch 1-2x daily.     Both breasts: No     Supplement: Expressed breast milk and Formula  Quantity: 50mls every feeding  How given/devices:  Bottle     Nipple Shield Use: 24 mm  Recommended by: OP LC   When started? 7/2/2022    Breast Pumping:   Frequency: 8x  Type of pump: HGP  Flange size/type: 24mm  Pain with pumping    Maternal ROS:  Constitutional: No fever, chills. Feeling well  Breasts: No soreness of breasts and Soreness of nipples  Psychiatric: Experiencing anxiety, Feels exhausted and Feels overwhelmed  Mental Health: No mention of feeling irritable, agitated, angry, overwhelmed, apathy, exhaustion nor having sleep changes outside infant feeds/demands or appetite changes     Objective:     Maternal Physical Lactation Exam  General: No acute distress  Breasts: Symmetrical , Full, Plugged Duct - no evidence and Mastitis  - no S/S  Nipples: scabbed/crusting and across face of nipple  Psychiatric: Normal mood and affect. Her behavior is normal. Judgment and thought content normal   Mental Health: Did NOT exhibit sadness, crying, feeling overwhelmed, agitation or hypervigilance.     Assessment/Plan & Lactation Counseling:     Infant exam on infant chart    Infant Weight History:   06/28/2022: 7# 5oz  07/01/2022: 6# 7oz   07/02/2022: 6# 11.3oz  07/05/2022: 6# 15.3oz  07/11/2022: 7# 7.5oz    Infant intake at Breast:  L  18mls     R 2mls     Total: 20mls  Milk Transfer at this feeding:   Ineffective breastfeeding; not able to transfer a full feed from breast r/t, improvement from last week   Pumped: Type of Pump: N/A   Quantity Pumped:  N/A  Initiation of Feeding: Infant initiates  Position of Feeding:    Right: cross cradle   Left: cross cradle  Attachment Achieved: rapidly  Nipple shield:    Size: 24mm       Introduced 7/2/2022  Latch achieved, yes  Suck Pattern at the breast: Suck burst and normal rest  Suck Pattern on the bottle: Suck burst and normal rest, dad paces well  Behavior Following Observed Feeding: content and sleeping  Nipple Pain: No    Latch: Assisted latch  Suckling/Feeding: attaches, baby falls asleep, baby roots, elicits JUAN DIEGO and frequent pauses    Milk Supply Available: low and delayed, improving   Low milk supply:   Likely due to: delay in lactogenesis II and ineffective or infrequent breast stimulation or milk removal    Maternal Diagnosis/Problem:  Lactation Suppression       MATERNAL PERSONALIZED BREASTFEEDING PLAN  Discussed concerns and symptoms as listed above in assessment and guidance summarized below.  Shared decision making was used between myself and the family for this encounter, home care, and follow up.  Topics reviewed included:  • Milk supply is improving. Will continue to pump to protect supply.     • Feeding:   o Feed your baby every 1.5-2.5 hours during the day, no need to wake for nighttime feedings.   o Awaken baby for feeding if going over 2.5 hours in the day.   • Supplement:   o Supplement with expressed milk and formula   - Baby needs 540-600mls every 24 hours  - Try for 70mls for daytime feedings  • Nipple shield (NS): Continue to Use    • Pumping Guidelines:  o Both breasts   o Suggested Pump Routine: 3am, 6am, 8am, 10am, 1pm, 4pm, 7pm and 10pm  o Pump 8 times in 24 hours  o Type of pump:  • Hospital grade   • Ameda Sandy Settings http://www.ameda.com/healthcare-professionals/videos/ameda-platinum-with-hygienikit-video  o Speed: Start at 80 then turn down to 60 after 1.5-2 minutes when you see milk in the flange channel  o Suction: To comfort, goal of  30-50%. NEVER to discomfort.   - Today you  were at 18 %  • Always double pump  • How long will vary woman to woman, typically 8-15 minutes, or 1-2 minutes after flow slows  • Flange Fit: Freely moving nipple in the tunnel with some movement of the areola.  • Today's evaluation indicates appropriate flange  • Storage (Acceptable guidelines for healthy term babies)  o 10 hours at room temp including your pieces, may rinse but not mandatory  o 8 days refrigerator, don't need  to refrigerate right away if using fresh pumped milk at the next feeding  o Freezer 1 year  o Deep freezer 2 years  o American Academy or Pediatrics has said you may mix different temperature milks.   o If baby drinks breastmilk from a fresh or refrigerated bottle of breastmilk,  you may return the unused portion to the refrigerator and use once at next feeding.     • Connect with other mothers:  o Facebook:   • [place] [facility]: [email or web address]  • Well-Nourished Babies (Private group for questions and support): https://[email or web address]/groups/125613342703219/  o Breastfeeding [place] [facility]  WEIGHT CHECKS  • Tuesdays 10am - 11am. Women's Health at [address], 3rd floor conference room  • Check your baby's weight, do a feeding and see how your baby is growing, visit with other mothers, plan on a walk or coffee date after group.  o Please wear a mask coming and going: you may remove it in the classroom  o Due to space limitations - no strollers please (New c/section moms should use the stroller)  o We would love to have dads stay, but moms won't breastfeed if there are men in the room, sorry.  o The room is generally only available from 10am -11am.   o Please take all diapers with you       In Conclusion:   Family present has verbalized what they can realistically do based on family dynamics, understanding a plan has to be doable to be effective and can be renegotiated at any time. This can be a complex and intimate journey. When obstacles present  themselves, it takes confidence, persistence and support. You are now the focus of our Breastfeeding Medicine team; we are here to support your decisions and goals.     Follow up requires close monitoring in this time sensitive window of opportunity to establish milk supply and facilitate the learning of  breastfeeding.    Follow-up for infant weight check and dyad breastfeeding evaluation in 14 day(s)  Please call [phone] if you have not scheduled your next appointment  Family is encouraged to e-mail us to update how the plan is working.    A total of 90 minutes, including infant assessment time, with more than 50% was spent preparing to see the patient, obtaining and reviewing separately obtained history, performing a medically appropriate examination and evaluation, counseling and educating the family, referring and communicating with other health care professionals, documenting clinical information in the electronic health record, independently interpreting weighted feeds and infant growth results, communicating these results to the family and care coordination as detailed in the above note.       Saima Jones

## 2022-07-12 NOTE — DISCHARGE SUMMARY
"DATE OF ADMISSION: 2022.    DATE OF DISCHARGE: 2022.      ADMISSION DIAGNOSES:  1.  A 37 weeks plus 6 days.  2.  Oligohydramnios.  3.  GBS negative.  4.  Size greater than dates.  5.  The patient is [facility] and is recommended for   induction at this time.    DISCHARGE DIAGNOSIS:  1.  As above.  2.  S/P  and repair of 2nd degree perineal laceration.    HPI: see dictated HPI.    Labor and delivery course: see dictated L and D delivery note.    Postpartum course:    PPD #1    S:  Doing well.  Pain is well controlled.  She is working on breast feeding.  She is ambulating, voiding spontaneously, and tolerating a regular diet.  She is ready for discharge.    O:  /67   Pulse 72   Temp 36.9 °C (98.5 °F) (Temporal)   Resp 18   Ht 1.676 m (5' 6\")   Wt 101 kg (223 lb)   SpO2 98%     A, A, and O x 3 NAD    FF u/1    No c/c/e     Latest Reference Range & Units 22 08:10 22 08:46   WBC 4.8 - 10.8 K/uL 14.6 (H) 20.6 (H)   RBC 4.20 - 5.40 M/uL 3.72 (L) 3.44 (L)   Hemoglobin 12.0 - 16.0 g/dL 10.6 (L) 10.2 (L)   Hematocrit 37.0 - 47.0 % 33.5 (L) 31.1 (L)   MCV 81.4 - 97.8 fL 90.1 90.4   MCH 27.0 - 33.0 pg 28.5 29.7   MCHC 33.6 - 35.0 g/dL 31.6 (L) 32.8 (L)   RDW 35.9 - 50.0 fL 45.1 45.1   Platelet Count 164 - 446 K/uL 263 261   MPV 9.0 - 12.9 fL 14.3 (H) 12.2   (H): Data is abnormally high  (L): Data is abnormally low    A/P: PPD #1 s/p  with repair of perineal laceration.    1.  D/C to home.  2.  Follow up in 6 weeks for a postpartum examination.  3.  Ambulate TID.  4.  Continue prenatal vitamins while breast feeding.    5.  D/C medications ibuprofen and OTC iron supplementation.  "

## 2022-07-25 ENCOUNTER — NON-PROVIDER VISIT (OUTPATIENT)
Dept: OBGYN | Facility: CLINIC | Age: 27
End: 2022-07-25
Payer: COMMERCIAL

## 2022-07-25 NOTE — PROGRESS NOTES
Summary: Rhonda has been pumping 8x every 24 hours. Feeding baby every 2-3 hours throughout the day, up to 3.5 hours at night. Offering 75-90mls at each feeding. Attempting to latch occasionally. Reports pain with most attempts.  Today: Latched to the left breast, cross cradle hold, with the nipple shield. Baby transferred 20mls in 14 minutes. Latched to the right breast, football hold, transferred 18mls in 11 minutes. Pumped with hospital pump for 15 minutes, yielding 58mls between both breast.   Plan: Pump 8x every 24 hours with hospital pump, see settings and suggested routine below. Feed baby every 2-2.5 hours throughout the day, no need to wake for nighttime feedings. Can attempt to latch as desired.    Follow up:   Lactation appointment: As Needed   Pediatrician appointment: 2 Month Well Check    Referrals: None    Subjective:     Nuvia Roth is a 26 y.o. female here for lactation care. She is here today with  (Andres) and baby, Jamaal.    Concerns: Weight Check and Feeding Evaluation, Discuss Exclusively Pumping     HPI:   Pertinent  history:   Mother does not have a history of advanced maternal age, GDM, hypertension prior to pregnancy, insulin resistance, multiple gestation, PCOS and thyroid disease. Common condition(s) which may interfere with milk supply.      Breast changes in pregnancy: Yes  History of breast surgeries: No      FEEDING HISTORY:    Past breastfeeding history: First baby   Hospital course: Difficulty latching in the hospital. [place] baby was not getting enough food but was encouraged it was normal.   Prior to consultation on 2022: First pediatrician appointment on day 3, 12% weight loss and concern for jaundice. Bili levels came back at normal levels, not requiring treatment. Pumping and supplementing initiated. Pumping every 2-3 hours throughout the day and night. Feeding 50mls of breastmilk and formula for most feedings. Attempting to latch up to 1-2x daily  with nipple shield. Reports pain and not feeling baby is on the breast correctly.   Prior to consultation on 7/11/2022: Pumping 8x every 24 hours, yielding 25-80mls between both breasts. Reports higher volumes at night. Feeding baby every 2-3 hours throughout the day, up to 3 hours at night. Offering 40-60mls per feeding. Attempting to latch 1-2x daily.   Currently 7/25/2022: Pumping 8x every 24 hours. Feeding baby every 2-3 hours throughout the day, up to 3.5 hours at night. Offering 75-90mls at each feeding. Attempting to latch occasionally. Reports pain with most attempts.     Both breasts: No     Supplement: Expressed breast milk and Formula  Quantity: 75-90mls every feeding  How given/devices:  Bottle     Nipple Shield Use: 24 mm  Recommended by: OP LC   When started? 7/2/2022    Breast Pumping:   Frequency: 8x  Type of pump: HGP  Flange size/type: 24mm  Pain with pumping    Maternal ROS:  Constitutional: No fever, chills. Feeling well  Breasts: No soreness of breasts and No soreness of nipples  Psychiatric: Experiencing anxiety, Feels exhausted and Feels overwhelmed  Mental Health: No mention of feeling irritable, agitated, angry, overwhelmed, apathy, exhaustion nor having sleep changes outside infant feeds/demands or appetite changes     Objective:     Maternal Physical Lactation Exam  General: No acute distress  Breasts: Symmetrical , Full, Plugged Duct - no evidence and Mastitis  - no S/S  Nipples: Intact  Psychiatric: Normal mood and affect. Her behavior is normal. Judgment and thought content normal   Mental Health: Did NOT exhibit sadness, crying, feeling overwhelmed, agitation or hypervigilance.     Assessment/Plan & Lactation Counseling:     Infant exam on infant chart    Infant Weight History:   06/28/2022: 7# 5oz  07/01/2022: 6# 7oz   07/02/2022: 6# 11.3oz  07/05/2022: 6# 15.3oz  07/11/2022: 7# 7.5oz  07/25/2022: 8# 13.2oz    Infant intake at Breast:  L  20mls     R 18mls     Total: 38mls  Milk  Transfer at this feeding:   Ineffective breastfeeding; not able to transfer a full feed from breast r/t, significant improvement from previous appointment   Pumped: Type of Pump: HGP  Quantity Pumped: R  18mls  L  40mls  Total: 58mls  Initiation of Feeding: Infant initiates  Position of Feeding:    Right: football   Left: cross cradle  Attachment Achieved: rapidly  Nipple shield:    Size: 24mm       Introduced 7/2/2022  Latch achieved, yes  Suck Pattern at the breast: Suck burst and normal rest  Suck Pattern on the bottle: N/A  Behavior Following Observed Feeding: content and sleeping  Nipple Pain: No    Latch: Assisted latch  Suckling/Feeding: attaches, baby falls asleep, baby roots, elicits JUAN DIEGO and frequent pauses    Milk Supply Available: low and delayed, improving   Low milk supply:   Likely due to: delay in lactogenesis II and ineffective or infrequent breast stimulation or milk removal    Maternal Diagnosis/Problem:  Lactation Suppression       MATERNAL PERSONALIZED BREASTFEEDING PLAN  Discussed concerns and symptoms as listed above in assessment and guidance summarized below.  Shared decision making was used between myself and the family for this encounter, home care, and follow up.  Topics reviewed included:  • Milk supply is improving. Will continue to pump to protect supply.   • Goat's Rue: Continue taking at this time.    • Feeding:   o Feed your baby every 1.5-2.5 hours during the day, no need to wake for nighttime feedings.   o Awaken baby for feeding if going over 2.5 hours in the day.   • Supplement:   o Supplement with expressed milk and formula   - Baby needs 600-660mls every 24 hours  • Nipple shield (NS): Continue to use if latching   • Pumping Guidelines:  o Both breasts   o Suggested Pump Routine: 3am, 6am, 8am, 10am, 1pm, 4pm, 7pm and 10pm  o Pump 8 times in 24 hours  o Type of pump:  • Hospital grade and Medela Max Flow   • Ameda Platinum Settings  http://[email or web address]/healthcare-professionals/videos/ameda-platinum-with-hygienikit-video  o Speed: Start at 80 then turn down to 60 after 1.5-2 minutes when you see milk in the flange channel  o Suction: To comfort, goal of  30-50%. NEVER to discomfort.   - Today you were at 18 %  • Always double pump  • How long will vary woman to woman, typically 8-15 minutes, or 1-2 minutes after flow slows  • Flange Fit: Freely moving nipple in the tunnel with some movement of the areola.  • Today's evaluation indicates appropriate flange  • Storage (Acceptable guidelines for healthy term babies)  o 10 hours at room temp including your pieces, may rinse but not mandatory  o 8 days refrigerator, don't need  to refrigerate right away if using fresh pumped milk at the next feeding  o Freezer 1 year  o Deep freezer 2 years  o American Academy or Pediatrics has said you may mix different temperature milks.   o If baby drinks breastmilk from a fresh or refrigerated bottle of breastmilk,  you may return the unused portion to the refrigerator and use once at next feeding.     • Connect with other mothers:  o Facebook:   • [place] [facility]: [email or web address]  • Well-Nourished Babies (Private group for questions and support): https://www.facebook.com/groups/107338095271958/  o Breastfeeding [place] [facility]  WEIGHT CHECKS  • Tuesdays 10am - 11am. Women's Health at [address], 3rd floor conference room  • Check your baby's weight, do a feeding and see how your baby is growing, visit with other mothers, plan on a walk or coffee date after group.  o Please wear a mask coming and going: you may remove it in the classroom  o Due to space limitations - no strollers please (New c/section moms should use the stroller)  o We would love to have dads stay, but moms won't breastfeed if there are men in the room, sorry.  o The room is generally only available from 10am -11am.   o Please take all diapers with you        In Conclusion:   Family present has verbalized what they can realistically do based on family dynamics, understanding a plan has to be doable to be effective and can be renegotiated at any time. This can be a complex and intimate journey. When obstacles present themselves, it takes confidence, persistence and support. You are now the focus of our Breastfeeding Medicine team; we are here to support your decisions and goals.     Follow up requires close monitoring in this time sensitive window of opportunity to establish milk supply and facilitate the learning of  breastfeeding.    Follow-up for infant weight check and dyad breastfeeding evaluation As Needed  Please call [phone] if you have not scheduled your next appointment  Family is encouraged to e-mail us to update how the plan is working.    A total of 60 minutes, including infant assessment time, with more than 50% was spent preparing to see the patient, obtaining and reviewing separately obtained history, performing a medically appropriate examination and evaluation, counseling and educating the family, referring and communicating with other health care professionals, documenting clinical information in the electronic health record, independently interpreting weighted feeds and infant growth results, communicating these results to the family and care coordination as detailed in the above note.       Saima Jones

## 2023-11-05 ENCOUNTER — OFFICE VISIT (OUTPATIENT)
Dept: URGENT CARE | Facility: CLINIC | Age: 28
End: 2023-11-05
Payer: COMMERCIAL

## 2023-11-05 VITALS
HEART RATE: 78 BPM | OXYGEN SATURATION: 97 % | HEIGHT: 66 IN | RESPIRATION RATE: 16 BRPM | TEMPERATURE: 97.1 F | BODY MASS INDEX: 24.91 KG/M2 | DIASTOLIC BLOOD PRESSURE: 60 MMHG | SYSTOLIC BLOOD PRESSURE: 118 MMHG | WEIGHT: 155 LBS

## 2023-11-05 DIAGNOSIS — H10.9 BACTERIAL CONJUNCTIVITIS OF RIGHT EYE: ICD-10-CM

## 2023-11-05 PROCEDURE — 3074F SYST BP LT 130 MM HG: CPT | Performed by: PHYSICIAN ASSISTANT

## 2023-11-05 PROCEDURE — 3078F DIAST BP <80 MM HG: CPT | Performed by: PHYSICIAN ASSISTANT

## 2023-11-05 PROCEDURE — 99203 OFFICE O/P NEW LOW 30 MIN: CPT | Performed by: PHYSICIAN ASSISTANT

## 2023-11-05 RX ORDER — POLYMYXIN B SULFATE AND TRIMETHOPRIM 1; 10000 MG/ML; [USP'U]/ML
1 SOLUTION OPHTHALMIC EVERY 4 HOURS
Qty: 10 ML | Refills: 0 | Status: SHIPPED | OUTPATIENT
Start: 2023-11-05 | End: 2023-11-12

## 2023-11-05 ASSESSMENT — ENCOUNTER SYMPTOMS
DOUBLE VISION: 0
CHILLS: 0
PHOTOPHOBIA: 0
FOREIGN BODY SENSATION: 0
EYE ITCHING: 1
ABDOMINAL PAIN: 0
RESPIRATORY NEGATIVE: 1
NAUSEA: 0
VOMITING: 0
EYE DISCHARGE: 1
CARDIOVASCULAR NEGATIVE: 1
FEVER: 0
DIARRHEA: 0
EYE PAIN: 0
NEUROLOGICAL NEGATIVE: 1
EYE REDNESS: 1
MUSCULOSKELETAL NEGATIVE: 1
BLURRED VISION: 0

## 2023-11-05 ASSESSMENT — VISUAL ACUITY: OU: 1

## 2023-11-05 NOTE — PROGRESS NOTES
"Subjective     Nuvia Roth is a very pleasant 28 y.o. female who presents with Redness Or Discharge From Eye (Left eye )            Eye Problem   The left eye is affected. This is a new problem. The current episode started yesterday. The problem occurs constantly. The problem has been unchanged. There was no injury mechanism. There is No known exposure to pink eye. She Does not wear contacts. Associated symptoms include an eye discharge, eye redness and itching. Pertinent negatives include no blurred vision, double vision, fever, foreign body sensation, nausea, photophobia, recent URI or vomiting. She has tried nothing for the symptoms. The treatment provided no relief.         PMH:  has no past medical history on file.  MEDS: No current outpatient medications on file.  ALLERGIES: No Known Allergies  SURGHX: No past surgical history on file.  SOCHX:  reports that she has never smoked. She has never used smokeless tobacco. She reports that she does not currently use alcohol. She reports that she does not use drugs.  FH: family history is not on file.      Review of Systems   Constitutional:  Negative for chills and fever.   HENT: Negative.     Eyes:  Positive for discharge, redness and itching. Negative for blurred vision, double vision, photophobia and pain.   Respiratory: Negative.     Cardiovascular: Negative.    Gastrointestinal:  Negative for abdominal pain, diarrhea, nausea and vomiting.   Musculoskeletal: Negative.    Neurological: Negative.        Medications, Allergies, and current problem list reviewed today in Epic           Objective     /60   Pulse 78   Temp 36.2 °C (97.1 °F)   Resp 16   Ht 1.676 m (5' 6\")   Wt 70.3 kg (155 lb)   SpO2 97%   BMI 25.02 kg/m²      Physical Exam  Vitals and nursing note reviewed.   Constitutional:       General: She is not in acute distress.     Appearance: Normal appearance. She is well-developed. She is not ill-appearing, toxic-appearing or diaphoretic. "   HENT:      Head: Normocephalic and atraumatic.      Right Ear: Hearing and external ear normal.      Left Ear: Hearing and external ear normal.      Nose: Nose normal. No congestion or rhinorrhea.      Mouth/Throat:      Pharynx: No oropharyngeal exudate.   Eyes:      General: Lids are normal. Lids are everted, no foreign bodies appreciated. Vision grossly intact.         Right eye: Discharge present. No hordeolum.         Left eye: No discharge or hordeolum.      Extraocular Movements: Extraocular movements intact.      Conjunctiva/sclera:      Right eye: Right conjunctiva is injected.      Pupils: Pupils are equal, round, and reactive to light.   Cardiovascular:      Rate and Rhythm: Normal rate and regular rhythm.      Heart sounds: Normal heart sounds.   Pulmonary:      Effort: Pulmonary effort is normal. No respiratory distress.      Breath sounds: Normal breath sounds. No wheezing.   Musculoskeletal:      Cervical back: Normal range of motion and neck supple.   Lymphadenopathy:      Cervical: No cervical adenopathy.   Skin:     General: Skin is warm and dry.   Neurological:      General: No focal deficit present.      Mental Status: She is alert and oriented to person, place, and time.   Psychiatric:         Mood and Affect: Mood normal.         Behavior: Behavior normal.         Thought Content: Thought content normal.         Judgment: Judgment normal.                             Assessment & Plan     This is a very pleasant 20-year-old female presenting with right eye redness, irritation, itching and colored discharge.  No pain or vision changes.  Denies URI symptoms, fever or dizziness.  Vitals normal.  Exam shows right conjunctival injection and irritation with colored discharge.  Vision grossly intact.  PERRLA, EOMs intact.  No foreign body or gross deformity.    1. Bacterial conjunctivitis of right eye  polymixin-trimethoprim (POLYTRIM) 98674-5.1 UNIT/ML-% Solution          I personally reviewed prior  external notes and test results pertinent to today's visit. Return to clinic or go to ED if symptoms worsen or persist. Red flag symptoms and indications for ED discussed at length. Patient/Parent/Guardian voices understanding.  AVS with post-visit instructions provided or given verbally.  Follow-up with your primary care provider in 3-5 days. All side effects and potential interactions of prescribed medication discussed including allergic response, GI upset, tendon injury, rash, sedation, OCP effectiveness, etc.    Please note that this dictation was created using voice recognition software. I have made every reasonable attempt to correct obvious errors, but I expect that there are errors of grammar and possibly content that I did not discover before finalizing the note.

## 2025-07-29 ENCOUNTER — APPOINTMENT (OUTPATIENT)
Dept: URBAN - METROPOLITAN AREA CLINIC 6 | Facility: CLINIC | Age: 30
Setting detail: DERMATOLOGY
End: 2025-07-29

## 2025-07-29 DIAGNOSIS — L70.0 ACNE VULGARIS: ICD-10-CM | Status: INADEQUATELY CONTROLLED

## 2025-07-29 DIAGNOSIS — L73.8 OTHER SPECIFIED FOLLICULAR DISORDERS: ICD-10-CM

## 2025-07-29 PROCEDURE — ? ADDITIONAL NOTES

## 2025-07-29 PROCEDURE — ? PHOTO-DOCUMENTATION

## 2025-07-29 PROCEDURE — ? COUNSELING

## 2025-07-29 PROCEDURE — ? PRESCRIPTION

## 2025-07-29 PROCEDURE — ? PRESCRIPTION MEDICATION MANAGEMENT

## 2025-07-29 RX ORDER — TRETIONIN 0.25 MG/G
CREAM TOPICAL QHS
Qty: 45 | Refills: 3 | Status: ERX | COMMUNITY
Start: 2025-07-29

## 2025-07-29 RX ADMIN — TRETIONIN: 0.25 CREAM TOPICAL at 00:00

## 2025-07-29 ASSESSMENT — SEVERITY ASSESSMENT OVERALL AMONG ALL PATIENTS
IN YOUR EXPERIENCE, AMONG ALL PATIENTS YOU HAVE SEEN WITH THIS CONDITION, HOW SEVERE IS THIS PATIENT'S CONDITION?: MULTIPLE INFLAMMATORY LESIONS BUT NONINFLAMMATORY LESIONS PREDOMINATE

## 2025-07-29 ASSESSMENT — LOCATION DETAILED DESCRIPTION DERM
LOCATION DETAILED: LEFT MEDIAL MALAR CHEEK
LOCATION DETAILED: RIGHT SUPERIOR MEDIAL BUCCAL CHEEK
LOCATION DETAILED: LEFT SUPERIOR CENTRAL BUCCAL CHEEK

## 2025-07-29 ASSESSMENT — LOCATION ZONE DERM: LOCATION ZONE: FACE

## 2025-07-29 ASSESSMENT — LOCATION SIMPLE DESCRIPTION DERM
LOCATION SIMPLE: RIGHT CHEEK
LOCATION SIMPLE: LEFT CHEEK